# Patient Record
Sex: FEMALE | Race: WHITE | NOT HISPANIC OR LATINO | Employment: FULL TIME | URBAN - METROPOLITAN AREA
[De-identification: names, ages, dates, MRNs, and addresses within clinical notes are randomized per-mention and may not be internally consistent; named-entity substitution may affect disease eponyms.]

---

## 2017-08-28 ENCOUNTER — CONVERSION ENCOUNTER (OUTPATIENT)
Dept: OTHER | Facility: HOSPITAL | Age: 62
End: 2017-08-28

## 2018-08-21 ENCOUNTER — OFFICE VISIT CONVERTED (OUTPATIENT)
Dept: FAMILY MEDICINE CLINIC | Age: 63
End: 2018-08-21
Attending: FAMILY MEDICINE

## 2018-08-31 ENCOUNTER — CONVERSION ENCOUNTER (OUTPATIENT)
Dept: OTHER | Facility: HOSPITAL | Age: 63
End: 2018-08-31

## 2018-09-10 ENCOUNTER — CONVERSION ENCOUNTER (OUTPATIENT)
Dept: OTHER | Facility: HOSPITAL | Age: 63
End: 2018-09-10

## 2019-02-28 ENCOUNTER — OFFICE VISIT CONVERTED (OUTPATIENT)
Dept: FAMILY MEDICINE CLINIC | Age: 64
End: 2019-02-28
Attending: FAMILY MEDICINE

## 2019-06-07 ENCOUNTER — OFFICE VISIT CONVERTED (OUTPATIENT)
Dept: FAMILY MEDICINE CLINIC | Age: 64
End: 2019-06-07
Attending: FAMILY MEDICINE

## 2019-09-23 ENCOUNTER — HOSPITAL ENCOUNTER (OUTPATIENT)
Dept: OTHER | Facility: HOSPITAL | Age: 64
Discharge: HOME | End: 2019-09-23

## 2020-04-13 ENCOUNTER — OFFICE VISIT CONVERTED (OUTPATIENT)
Dept: FAMILY MEDICINE CLINIC | Age: 65
End: 2020-04-13
Attending: FAMILY MEDICINE

## 2020-10-12 ENCOUNTER — HOSPITAL ENCOUNTER (OUTPATIENT)
Dept: OTHER | Facility: HOSPITAL | Age: 65
Discharge: HOME | End: 2020-10-12

## 2021-01-22 ENCOUNTER — OFFICE VISIT CONVERTED (OUTPATIENT)
Dept: FAMILY MEDICINE CLINIC | Age: 66
End: 2021-01-22
Attending: FAMILY MEDICINE

## 2021-05-18 NOTE — PROGRESS NOTES
Paris Wright 1955     Office/Outpatient Visit    Visit Date: Tue, Aug 21, 2018 10:11 am    Provider: Saleem Jimenez MD (Assistant: Jovita Wright MA)    Location: [facility]        Electronically signed by Saleem Jimenez MD on  08/21/2018 06:05:33 PM                             SUBJECTIVE:        CC: blood pressure follow up         HPI:         Patient presents with hypertension.  Her current cardiac medication regimen includes a beta-blocker ( Toprol-XL ) and an ACE inhibitor ( Zestril ).  She did not bring her blood pressure diary, but says that pressures have been too high.  She is tolerating the medication well without side effects.  Compliance with treatment has been good; she takes her medication as directed and follows up as directed.      ROS:     CONSTITUTIONAL:  Negative for chills and fever.      CARDIOVASCULAR:  Negative for chest pain and palpitations.      RESPIRATORY:  Negative for recent cough and dyspnea.      GASTROINTESTINAL:  Negative for abdominal pain, nausea and vomiting.      INTEGUMENTARY/BREAST:  Negative for atypical mole(s) and rash.          [facility]/:     Last Reviewed on 8/21/2018 10:43 AM by Saleem Jimenez    Past Medical History:                 PAST MEDICAL HISTORY         Hypertension     Osteoarthritis: primarily affecting the R Knee;         PREVENTIVE HEALTH MAINTENANCE             COLORECTAL CANCER SCREENING: Up to date (colonoscopy q10y; sigmoidoscopy q5y; Cologuard q3y) was last done 09/2017, Results are in chart; colonoscopy with the following abnormalities noted-- tubular adenoma; The next colonoscopy is due  09/2020     MAMMOGRAM: Done within last 2 years and results in are chart was last done 08/2017 with normal results         Surgical History:         R Knee - arthroscopy;         Family History:         Positive for Coronary Artery Disease ( mother ) and Hypertension ( father; mother ).      Positive for Leukemia ( brother ) and Lung  "Cancer ( father ).      Positive for Hypothyroidism ( mother ).          Social History:     Occupation: [employer]     Marital Status:      Children: 2 children         Tobacco/Alcohol/Supplements:     Last Reviewed on 8/21/2018 10:43 AM by Saleem Jimenez    Tobacco: She has never smoked.          Alcohol: Frequency:    \"happy hours - once every 6 weeks maybe\";         Substance Abuse History:     Last Reviewed on 8/21/2018 10:43 AM by Saleem Jimenez    NEGATIVE         Mental Health History:     Last Reviewed on 8/21/2018 10:43 AM by Saleem Jimenez        Communicable Diseases (eg STDs):     Last Reviewed on 8/21/2018 10:43 AM by Saleem Jimenez            Current Problems:     Last Reviewed on 8/21/2018 10:43 AM by Saleem Jimenez    Screening for hypercholesterolemia     Hyperproteinemia     Screening for cancer of colon     Hypertension         Immunizations:     Flulaval 8/31/2010     Flulaval 9/21/2011         Allergies:     Last Reviewed on 8/21/2018 10:43 AM by Saleem Jimenez      No Known Drug Allergies.         Current Medications:     Last Reviewed on 8/21/2018 10:43 AM by Saleem Jimenez    Lisinopril 20mg Tablet Take 1 tablet(s) by mouth daily     Metoprolol Succinate 100mg Tablets, Extended Release Take 1 tablet(s) by mouth daily         OBJECTIVE:        Vitals:         Current: 8/21/2018 10:16:17 AM    Ht:  5 ft, 5.5 in;  Wt: 217.6 lbs;  BMI: 35.7    T: 98.5 F (oral);  BP: 148/69 mm Hg (left arm, sitting);  P: 72 bpm (right arm (BP Cuff), sitting);  sCr: 0.7 mg/dL;  GFR: 96.23        Exams:     PHYSICAL EXAM:     GENERAL: vital signs recorded - well developed,  moderately obese;  no apparent distress;     EYES: extraocular movements intact; conjunctiva and cornea are normal; PERRLA;     E/N/T:  normal EACs, TMs, nasal/oral mucosa, teeth, gingiva, and oropharynx;     NECK: range of motion is normal; thyroid is non-palpable;     " RESPIRATORY: normal respiratory rate and pattern with no distress; normal breath sounds with no rales, rhonchi, wheezes or rubs;     CARDIOVASCULAR: normal rate; rhythm is regular;  no systolic murmur; no edema;     GASTROINTESTINAL: nontender; normal bowel sounds; no organomegaly;     BREAST/INTEGUMENT: SKIN: no significant rashes or lesions; no suspicious moles;         ASSESSMENT           401.1   I10  Hypertension              DDx:         ORDERS:         Meds Prescribed:       Refill of: Lisinopril 20mg Tablet Take 1 tablet(s) by mouth daily  #90 (Ninety) tablet(s) Refills: 3       Refill of: Metoprolol Succinate 100mg Tablets, Extended Release Take 1 tablet(s) by mouth daily  #90 (Ninety) tablet(s) Refills: 3       Furosemide 20mg Tablets Take 1 tablet(s) by mouth daily  #30 (Thirty) tablet(s) Refills: 0         Radiology/Test Orders:       3014F  Screening mammography results documented and reviewed (PV)1  (In-House)         3017F  Colorectal CA screen results documented and reviewed (PV)  (In-House)           Other Orders:         Calculated BMI above the upper parameter and a follow-up plan was documented in the medical record  (In-House)                   PLAN:          Hypertension         RECOMMENDATIONS given include: Today, we have reviewed Paris's care.  I'm going to add a low dose of furosemide to what she is already taking.  We will go ahead and check labs as noted.  No other changes are anticipated.  We will schedule screening mammogram.  We will follow closely moving forward..  MIPS     MAMMOGRAM: Done within last 2 years and results in are chart     COLORECTAL CANCER SCREENING: Results are in chart     BMI Elevated - Follow-Up Plan: She was provided education on weight loss strategies           Prescriptions:       Refill of: Lisinopril 20mg Tablet Take 1 tablet(s) by mouth daily  #90 (Ninety) tablet(s) Refills: 3       Refill of: Metoprolol Succinate 100mg Tablets, Extended Release Take 1  tablet(s) by mouth daily  #90 (Ninety) tablet(s) Refills: 3       Furosemide 20mg Tablets Take 1 tablet(s) by mouth daily  #30 (Thirty) tablet(s) Refills: 0           Orders:       3014F  Screening mammography results documented and reviewed (PV)1  (In-House)         3017F  Colorectal CA screen results documented and reviewed (PV)  (In-House)           Calculated BMI above the upper parameter and a follow-up plan was documented in the medical record  (In-House)             Patient Education Handouts:       High Blood Pressure (HTN)              CHARGE CAPTURE           **Please note: ICD descriptions below are intended for billing purposes only and may not represent clinical diagnoses**        Primary Diagnosis:         401.1 Hypertension            I10    Essential (primary) hypertension              Orders:          28181   Office/outpatient visit; established patient, level 3  (In-House)             3014F   Screening mammography results documented and reviewed (PV)1  (In-House)             3017F   Colorectal CA screen results documented and reviewed (PV)  (In-House)                Calculated BMI above the upper parameter and a follow-up plan was documented in the medical record  (In-House)               ADDENDUMS:      ____________________________________    Addendum: 09/28/2018 03:54 PM - Crystal Enrique         Visit Note Faxed to:        Ramos Jimenez  (General Surgery); Number [phone]            Addendum: 10/01/2018 09:52 AM - Andreina Shipman         Visit Note Faxed to:        Ramos Jimenez  (General Surgery); Number [phone]            Addendum: 10/01/2018 11:10 AM - Crystal Enrique         Visit Note Faxed to:        Matthew Rothman (Oncology); Number [phone]            Addendum: 10/01/2018 02:23 PM - Four, Team         Visit Note Faxed to:        User Entered Recipient; Number [phone]

## 2021-05-18 NOTE — PROGRESS NOTES
Paris Wright  1955     Office/Outpatient Visit    Visit Date: Fri, Jan 22, 2021 10:45 am    Provider: Saleem Jimenez MD (Assistant: Gin Gar RN)    Location: [facility]        Electronically signed by Saleem Jimenez MD on  01/25/2021 06:32:34 AM                             Subjective:        CC: blood pressure, elevated glucose    HPI:           Patient presents with essential (primary) hypertension.  Her current cardiac medication regimen includes a beta-blocker ( Toprol-XL ) and an ACE inhibitor ( Zestril ).  She did not bring her blood pressure diary, but says that typical readings show systolics in the 130s and diastolics in the 80s.  She is tolerating the medication well without side effects.  Compliance with treatment has been good; she takes her medication as directed and follows up as directed.      ROS:     CONSTITUTIONAL:  Negative for chills and fever.      CARDIOVASCULAR:  Negative for chest pain and palpitations.      RESPIRATORY:  Negative for recent cough and dyspnea.      GASTROINTESTINAL:  Negative for abdominal pain, nausea and vomiting.      INTEGUMENTARY/BREAST:  Negative for atypical mole(s) and rash.          Past Medical History / Family History / Social History:         Last Reviewed on 1/22/2021 11:14 AM by Saleem Jimenez    Past Medical History:                 PAST MEDICAL HISTORY         Hypertension     Osteoarthritis: primarily affecting the R Knee;     Breast cancer: ductal carcinoma in situ;         CURRENT MEDICAL PROVIDERS:    Oncologist: Dr. Rothman             ADVANCE DIRECTIVES: Living will - She says that her son or daughter would make decisions if needed.          PREVENTIVE HEALTH MAINTENANCE             COLORECTAL CANCER SCREENING: Up to date (colonoscopy q10y; sigmoidoscopy q5y; Cologuard q3y) was last done 09/2017, Results are in chart; colonoscopy with the following abnormalities noted-- tubular adenoma; The next  "colonoscopy is due  09/2020     MAMMOGRAM: Done within last 2 years and results in are chart was last done 9/5/18 which was abnormal         Surgical History:         Lumpectomy R Breast - 11/2018;     R Knee - arthroscopy;         Family History:     Father: Hypertension;  Lung Cancer     Mother: Coronary Artery Disease; Hypertension; Hypothyroidism     Brother(s): Myelodysplastic Syndrome         Social History:     Occupation: [employer]     Marital Status:      Children: 2 children         Tobacco/Alcohol/Supplements:     Last Reviewed on 1/22/2021 11:14 AM by Saleem Jimenez    Tobacco: She has never smoked.          Alcohol: Frequency:    \"happy hours - once every 6 weeks maybe\";         Substance Abuse History:     Last Reviewed on 1/22/2021 11:14 AM by Saleem Jimenez    NEGATIVE         Mental Health History:     Last Reviewed on 1/22/2021 11:14 AM by Saleem Jimenez        Communicable Diseases (eg STDs):     Last Reviewed on 1/22/2021 11:14 AM by Saleem Jimenez        Current Problems:     Last Reviewed on 1/22/2021 11:14 AM by Saleem Jimenez    Essential (primary) hypertension    Encounter for screening for malignant neoplasm of colon    Other specified abnormalities of plasma proteins    Encounter for screening for lipoid disorders    Acute upper respiratory infection, unspecified    Impaired fasting glucose    Mixed hyperlipidemia        Immunizations:     Flulaval 8/31/2010    Flulaval 9/21/2011        Allergies:     Last Reviewed on 1/22/2021 11:14 AM by Saleem Jimenez    No Known Allergies.        Current Medications:     Last Reviewed on 1/22/2021 11:14 AM by Saleem Jimenez    lisinopriL 20 mg oral tablet [TAKE 1 TABLET DAILY NEEDS APPOINTMENT]    Vitamin D3 50 mcg (2,000 unit) oral capsule [1 capsule daily]    Oxybutynin Chloride 5 mg oral tablet [1 tab bid]    tamoxifen 20 mg oral tablet [1 tab daily]    metoprolol succinate 100 mg " oral Tablet, Extended Release 24 hr [TAKE 1 TABLET DAILY NEEDS APPOINTMENT]    Calcium+D  [OTC once daily]        Objective:        Vitals:         Current: 1/22/2021 10:52:00 AM    Ht:  5 ft, 5.5 in;  Wt: 220 lbs;  BMI: 36.1T: 97.8 F (temporal);  BP: 152/82 mm Hg (right arm, sitting);  P: 90 bpm (right arm (BP Cuff), sitting);  sCr: 0.7 mg/dL;  GFR: 94.21        Repeat:     11:23:30 AM  BP:   165/72mm Hg (right arm, sitting, pulse-85)     Exams:     PHYSICAL EXAM:     GENERAL: vital signs recorded - well developed,  moderately obese;  no apparent distress;     EYES: extraocular movements intact; conjunctiva and cornea are normal; PERRL;     NECK: range of motion is normal; thyroid is non-palpable;     RESPIRATORY: normal respiratory rate and pattern with no distress; normal breath sounds with no rales, rhonchi, wheezes or rubs;     CARDIOVASCULAR: normal rate; rhythm is regular;  no systolic murmur; no edema;     GASTROINTESTINAL: nontender; normal bowel sounds; no organomegaly;     LYMPHATIC: no enlargement of cervical or facial nodes; no supraclavicular nodes;     BREAST/INTEGUMENT: SKIN: no significant rashes or lesions; no suspicious moles;     NEUROLOGIC: mental status: alert and oriented x 3; cranial nerves II-XII grossly intact;     PSYCHIATRIC: appropriate affect and demeanor; normal psychomotor function;         Lab/Test Results:         Hemoglobin A1c: 6.7 (01/22/2021),     Performed by:: williams (01/22/2021),             Assessment:         I10   Essential (primary) hypertension       R73.01   Impaired fasting glucose       E11.9   Type 2 diabetes mellitus without complications           ORDERS:         Meds Prescribed:       [Refilled] lisinopriL 20 mg oral tablet [take 1 tablet (20 mg) by oral route once daily], #90 (ninety) tablets, Refills: 3 (three)       [Refilled] metoprolol succinate 100 mg oral Tablet, Extended Release 24 hr [take 1 tablet (100 mg) by oral route once daily], #90 (ninety) tablets,  Refills: 3 (three)         Radiology/Test Orders:       3017F  Colorectal CA screen results documented and reviewed (PV)  (In-House)              Lab Orders:       [id number]*  Hgb A1c fast lab  (In-House)              Procedures Ordered:       21693  Collection of capillary blood specimen (eg, finger, heel, ear stick)  (In-House)    Left middle finger/tls          Other Orders:       1101F  Pt screen for fall risk; document no falls in past year or only 1 fall w/o injury in past year (FENG)  (In-House)              Screening mammogram results documented  (Send-Out)            1123F  Advance Care Planning discussed and doc; advance care plan or surrogate decision maker doc. in MR  (Send-Out)                      Plan:         Essential (primary) hypertension        RECOMMENDATIONS given include: Paris seems well today.  Her pressure is running in a good range at home.  We will recheck her pressure here and refill needed medications.  A1C to be checked as noted.  We will move forward from there..  SARA Has had no falls or only one fall without injury in the past year Screening mammomgram done within last 2 years and results in are chart Colorectal Cancer Screening is up to date and the results are in the chart ACP discussion: Advance Directive/Surrogate Decision Maker discussed and scanned into chart           Prescriptions:       [Refilled] lisinopriL 20 mg oral tablet [take 1 tablet (20 mg) by oral route once daily], #90 (ninety) tablets, Refills: 3 (three)       [Refilled] metoprolol succinate 100 mg oral Tablet, Extended Release 24 hr [take 1 tablet (100 mg) by oral route once daily], #90 (ninety) tablets, Refills: 3 (three)           Orders:       1101F  Pt screen for fall risk; document no falls in past year or only 1 fall w/o injury in past year (FENG)  (In-House)              Screening mammogram results documented  (Send-Out)            3017F  Colorectal CA screen results documented and reviewed (PV)   (In-House)            1123F  Advance Care Planning discussed and doc; advance care plan or surrogate decision maker doc. in MR  (Send-Out)              Impaired fasting glucose    LABORATORY:  Labs ordered to be performed today include Hgb A1c inhouse fast lab.            Orders:       63410*  Hgb A1c fast lab  (In-House)            99435  Collection of capillary blood specimen (eg, finger, heel, ear stick)  (In-House)    Left middle finger/tls          Type 2 diabetes mellitus without complications    ADDENDUM - Please let Paris know that her A1C came back elevated at 6.7%.  This means that her average blood sugar the last 90 days has been around 140.  This is technically in the range of early diabetes.  I am not recommending medication for this yet, but I would encourage her to work on diet and some gradual weight loss.  Specifically, I would advise she avoid concentrated sugars such as regular soda, sweet tea, and desserts.  TICKLE for follow up visit with me to reassess her care.  Also, if she would like to see a dietician to review diet, make it so.  Let me know if she has concerns and forward her copy of this testing if she would like.  Thanks. - Saleem Jimenez MD - 1/22/21 - 12:331/22/21 2:42pm  tr/            Charge Capture:         Primary Diagnosis:     I10  Essential (primary) hypertension           Orders:      57331  Office/outpatient visit; established patient, level 4  (In-House)            1101F  Pt screen for fall risk; document no falls in past year or only 1 fall w/o injury in past year (FENG)  (In-House)            3017F  Colorectal CA screen results documented and reviewed (PV)  (In-House)              R73.01  Impaired fasting glucose           Orders:      93444*  Hgb A1c fast lab  (In-House)            31292  Collection of capillary blood specimen (eg, finger, heel, ear stick)  (In-House)    Left middle finger/tls          E11.9  Type 2 diabetes mellitus without complications          ADDENDUMS:      ____________________________________    Addendum: 01/26/2021 01:58 PM - Four, Team        lmtrc/th        Addendum: 01/27/2021 09:13 AM - Four, Team        pt inf re: addendum, tickled/th        Addendum: 01/27/2021 09:13 AM - Four, Team        pt declined referral to dietician at this time/th        Addendum: 04/21/2021 03:07 PM - Daniela Rosa        appointment reminder mailed to pt./atc

## 2021-05-18 NOTE — PROGRESS NOTES
Paris Wright  1955     Office/Outpatient Visit    Visit Date: Mon, Apr 13, 2020 11:22 am    Provider: Saleem Jimenez MD (Assistant: Jovita Wright MA)    Location: [facility]        Electronically signed by Saleem Jimenez MD on  04/15/2020 11:06:22 AM                             Subjective:        CC: blood pressure        TELEMEDICINE VISIT:    - Paris consented to this telemedicine visit.    - Persons present during the telemedicine consultation include:  Paris - patient, Dr. Jimenez    - This visit is being conducted over Big[employer] with audio and video.    HPI:           Patient to be evaluated for essential (primary) hypertension.  Her current cardiac medication regimen includes a beta-blocker ( Toprol-XL ) and an ACE inhibitor ( Zestril ).  She did not bring her blood pressure diary, but says that typical readings show systolics in the 130s and diastolics in the 80s.  She is tolerating the medication well without side effects.  Compliance with treatment has been good; she takes her medication as directed and follows up as directed.      ROS:     CONSTITUTIONAL:  Negative for chills and fever.      CARDIOVASCULAR:  Negative for chest pain and palpitations.      RESPIRATORY:  Negative for recent cough and dyspnea.      GASTROINTESTINAL:  Negative for abdominal pain, nausea and vomiting.      INTEGUMENTARY/BREAST:  Negative for atypical mole(s) and rash.          Past Medical History / Family History / Social History:         Last Reviewed on 4/13/2020 12:31 PM by Saleem Jimenez    Past Medical History:                 PAST MEDICAL HISTORY         Hypertension     Osteoarthritis: primarily affecting the R Knee;     Breast cancer: ductal carcinoma in situ;         CURRENT MEDICAL PROVIDERS:    Oncologist: Dr. Rothman             ADVANCE DIRECTIVES: Living will - She says that her son or daughter would make decisions if needed.          PREVENTIVE HEALTH  "MAINTENANCE             COLORECTAL CANCER SCREENING: Up to date (colonoscopy q10y; sigmoidoscopy q5y; Cologuard q3y) was last done 09/2017, Results are in chart; colonoscopy with the following abnormalities noted-- tubular adenoma; The next colonoscopy is due  09/2020     MAMMOGRAM: Done within last 2 years and results in are chart was last done 9/5/18 which was abnormal         Surgical History:         Lumpectomy R Breast - 11/2018;     R Knee - arthroscopy;         Family History:     Father: Hypertension;  Lung Cancer     Mother: Coronary Artery Disease; Hypertension; Hypothyroidism     Brother(s): Myelodysplastic Syndrome         Social History:     Occupation: [employer]     Marital Status:      Children: 2 children         Tobacco/Alcohol/Supplements:     Last Reviewed on 4/13/2020 12:31 PM by Saleem Jimenez    Tobacco: She has never smoked.          Alcohol: Frequency:    \"happy hours - once every 6 weeks maybe\";         Substance Abuse History:     Last Reviewed on 4/13/2020 12:31 PM by Saleem Jimenez    NEGATIVE         Mental Health History:     Last Reviewed on 4/13/2020 12:31 PM by Saleem Jimenez        Communicable Diseases (eg STDs):     Last Reviewed on 4/13/2020 12:31 PM by Saleem Jimenez        Current Problems:     Last Reviewed on 4/13/2020 12:31 PM by Saleem Jimenez    Encounter for screening for malignant neoplasm of colon    Other specified abnormalities of plasma proteins    Encounter for screening for lipoid disorders    Acute upper respiratory infection, unspecified    Impaired fasting glucose    Mixed hyperlipidemia    Essential (primary) hypertension        Immunizations:     Flulaval 8/31/2010    Flulaval 9/21/2011        Allergies:     Last Reviewed on 4/13/2020 12:31 PM by Saleem Jimenez    No Known Allergies.        Current Medications:     Last Reviewed on 4/13/2020 12:31 PM by Saleem Jimenez    lisinopriL 20 mg oral " tablet [Take 1 tablet(s) by mouth daily]    Vitamin D3 50 mcg (2,000 unit) oral capsule [1 capsule daily]    Oxybutynin Chloride 5 mg oral tablet [1 tab bid]    tamoxifen 20 mg oral tablet [1 tab daily]    metoprolol succinate 100 mg oral Tablet, Extended Release 24 hr [Take 1 tablet(s) by mouth daily]        Objective:        Exams:     PHYSICAL EXAM:     GENERAL: well developed, well nourished;  no apparent distress;     EYES: extraocular movements intact; conjunctiva and cornea are normal;     RESPIRATORY: normal respiratory rate and pattern with no distress;     NEUROLOGIC: mental status: alert and oriented x 3;     PSYCHIATRIC: appropriate affect and demeanor; normal psychomotor function;         Assessment:         I10   Essential (primary) hypertension           ORDERS:         Meds Prescribed:       [Refilled] lisinopriL 20 mg oral tablet [Take 1 tablet(s) by mouth daily], #90 (ninety) tablets, Refills: 3 (three)       [Refilled] metoprolol succinate 100 mg oral Tablet, Extended Release 24 hr [Take 1 tablet(s) by mouth daily], #90 (ninety) tablets, Refills: 3 (three)         Radiology/Test Orders:       3017F  Colorectal CA screen results documented and reviewed (PV)  (In-House)              Other Orders:         Screening mammogram results documented  (Send-Out)            1123F  Advance Care Planning discussed and doc; advance care plan or surrogate decision maker doc. in MR  (Send-Out)                      Plan:         Essential (primary) hypertension        RECOMMENDATIONS given include: Today, we have reviewed her care.  She seems well.  Her labs from the fall were normal.  We will refill her medications and follow from there..  MIPS Screening mammomgram done within last 2 years and results in are chart Colorectal Cancer Screening is up to date and the results are in the chart ACP discussion: Advance Directive/Surrogate Decision Maker discussed and scanned into chart           Prescriptions:        [Refilled] lisinopriL 20 mg oral tablet [Take 1 tablet(s) by mouth daily], #90 (ninety) tablets, Refills: 3 (three)       [Refilled] metoprolol succinate 100 mg oral Tablet, Extended Release 24 hr [Take 1 tablet(s) by mouth daily], #90 (ninety) tablets, Refills: 3 (three)           Orders:         Screening mammogram results documented  (Send-Out)            3017F  Colorectal CA screen results documented and reviewed (PV)  (In-House)            1123F  Advance Care Planning discussed and doc; advance care plan or surrogate decision maker doc. in MR  (Send-Out)                  Charge Capture:         Primary Diagnosis:     I10  Essential (primary) hypertension           Orders:      70864  Office/outpatient visit; established patient, level 3  (In-House)            3017F  Colorectal CA screen results documented and reviewed (PV)  (In-House)

## 2021-05-18 NOTE — PROGRESS NOTES
Paris Wright 1955     Office/Outpatient Visit    Visit Date: Thu, Feb 28, 2019 01:18 pm    Provider: Saleem Jimenez MD (Assistant: Gin Gar RN)    Location: [facility]        Electronically signed by Saleem Jimenez MD on  03/01/2019 06:08:38 AM                             SUBJECTIVE:        CC: cold symptoms         HPI:     Paris is in today for follow up on chills.  She says that she developed cough two days ago.  She coughed most of the night and had chills.  She has not checked her temperature.  She says that the cough is dry.  She says that she feels okay during the day.  She has more of an issue at night.  She is to start radiating in the next couple of weeks.  She is anticipating 15 treatments         With regard to the hypertension, her current cardiac medication regimen includes a beta-blocker ( Toprol-XL ) and an ACE inhibitor ( Zestril ).  She did not bring her blood pressure diary, but says that pressures have been okay.  She is tolerating the medication well without side effects.  Compliance with treatment has been good; she takes her medication as directed and follows up as directed.      ROS:         E/N/T:  Negative for diminished hearing and nasal congestion.      CARDIOVASCULAR:  Negative for chest pain and palpitations.      GASTROINTESTINAL:  Negative for abdominal pain, nausea and vomiting.      INTEGUMENTARY/BREAST:  Negative for atypical mole(s) and rash.          PMH/FMH/SH:     Last Reviewed on 2/28/2019 02:02 PM by Saleem Jimenez    Past Medical History:                 PAST MEDICAL HISTORY         Hypertension     Osteoarthritis: primarily affecting the R Knee;     Breast cancer: ductal carcinoma in situ;         PREVENTIVE HEALTH MAINTENANCE             COLORECTAL CANCER SCREENING: Up to date (colonoscopy q10y; sigmoidoscopy q5y; Cologuard q3y) was last done 09/2017, Results are in chart; colonoscopy with the following abnormalities noted-- tubular  "adenoma; The next colonoscopy is due  09/2020     MAMMOGRAM: Done within last 2 years and results in are chart was last done 9/5/18 which was abnormal         Surgical History:         Lumpectomy R Breast - 11/2018;      R Knee - arthroscopy;         Family History:         Positive for Coronary Artery Disease ( mother ) and Hypertension ( father; mother ).      Positive for Leukemia ( brother ) and Lung Cancer ( father ).      Positive for Hypothyroidism ( mother ).          Social History:     Occupation: [employer]     Marital Status:      Children: 2 children         Tobacco/Alcohol/Supplements:     Last Reviewed on 2/28/2019 02:02 PM by Saleem Jimenez    Tobacco: She has never smoked.          Alcohol: Frequency:    \"happy hours - once every 6 weeks maybe\";         Substance Abuse History:     Last Reviewed on 2/28/2019 02:02 PM by Saleem Jimenez    NEGATIVE         Mental Health History:     Last Reviewed on 2/28/2019 02:02 PM by Saleem Jimenez        Communicable Diseases (eg STDs):     Last Reviewed on 2/28/2019 02:02 PM by Saleem Jimenez            Current Problems:     Last Reviewed on 2/28/2019 02:02 PM by Saleem Jimenez    Chills     Screening for hypercholesterolemia     Hyperproteinemia     Screening for cancer of colon     Hypertension     Hyperglycemia         Immunizations:     Flulaval 8/31/2010     Flulaval 9/21/2011         Allergies:     Last Reviewed on 2/28/2019 02:02 PM by Saleem Jimenez      No Known Drug Allergies.         Current Medications:     Last Reviewed on 2/28/2019 02:02 PM by Saleem Jimenez    Lisinopril 20mg Tablet Take 1 tablet(s) by mouth daily     Metoprolol Succinate 100mg Tablets, Extended Release Take 1 tablet(s) by mouth daily     Oxybutynin Chloride 5mg Tablet 1 tab bid     Tamoxifen Citrate 20mg Tablet 1 tab daily     Vitamin D3 2,000IU Capsules 1 capsule daily         OBJECTIVE:        Vitals:         " Current: 2/28/2019 1:21:05 PM    Ht:  5 ft, 5.5 in;  Wt: 218.4 lbs;  BMI: 35.8    T: 99.3 F (oral);  BP: 134/64 mm Hg (left arm, sitting);  P: 84 bpm (left arm (BP Cuff), sitting);  sCr: 0.7 mg/dL;  GFR: 95.15        Exams:     PHYSICAL EXAM:     GENERAL: vital signs recorded - well developed,  moderately obese;  no apparent distress;     EYES: extraocular movements intact; conjunctiva and cornea are normal; PERRLA;     E/N/T:  normal EACs, TMs, nasal/oral mucosa, teeth, gingiva, and oropharynx;     NECK: range of motion is normal; thyroid is non-palpable;     RESPIRATORY: normal respiratory rate and pattern with no distress; normal breath sounds with no rales, rhonchi, wheezes or rubs;     CARDIOVASCULAR: normal rate; rhythm is regular;  no systolic murmur; no edema;     GASTROINTESTINAL: nontender; normal bowel sounds; no organomegaly;     LYMPHATIC: no enlargement of cervical or facial nodes; no supraclavicular nodes;     BREAST/INTEGUMENT: SKIN: no significant rashes or lesions; no suspicious moles;     NEUROLOGICAL:  cranial nerves, motor and sensory function, reflexes, gait and coordination are all intact;     PSYCHIATRIC:  appropriate affect and demeanor; normal speech pattern; grossly normal memory;         Lab/Test Results:             Influenza A and B:  Negative (02/28/2019),     Performed by::  CR (02/28/2019),     Hemoglobin A1c:  6.9 (02/28/2019),             ASSESSMENT           780.99   J06.9  Chills              DDx:     401.1   I10  Hypertension              DDx:     790.6   R73.01  Hyperglycemia              DDx:         ORDERS:         Meds Prescribed:       Promethazine with Dextromethrophan 6.25mg/15mg per 5ml Syrup Take 1 to 2 teaspoon by mouth q 4 to 6 hr as needed for cough  #4 (Four) oz Refills: 1       Benzonatate 200mg Capsules Take 1 capsule(s) by mouth tid prn  #24 (Twenty Four) capsule(s) Refills: 0         Radiology/Test Orders:       3014F  Screening mammography results documented  and reviewed (PV)1  (In-House)         3017F  Colorectal CA screen results documented and reviewed (PV)  (In-House)           Lab Orders:       00223  Infectious agent antigen detection by immunoassay; Influenza  (In-House)         92652-31  Infectious agent antigen detection by immunoassay; Influenza  (In-House)         70139*  Hgb A1c fast lab  (In-House)           Other Orders:         Calculated BMI above the upper parameter and a follow-up plan was documented in the medical record  (In-House)                   PLAN:          Chills         RECOMMENDATIONS given include: Today, we have reviewed Paris's care.  I'm going to cover her for viral syndrome as noted.  If she does not improve, she will return.  I don't see an indication for antibiotic at this time..  MIPS     MAMMOGRAM: Done within last 2 years and results in are chart     COLORECTAL CANCER SCREENING: Results are in chart     BMI Elevated - Follow-Up Plan: She was provided education on weight loss strategies         ADDENDUM - Please let Paris know that her sugar is higher than we have seen previously.  The A1C came back at 6.9% which means that her average sugar the last 90 days has been just below 150.  This is technically in a range for diabetes.  I am not recommending medication at this time, but I would recommend she try to limit her intake of sugars (regular soda, sweet tea, desserts) and carbs (bread, potatoes, pasta, rice).  Additionally, if she would start to walk regularly and work toward some gradual weight loss it would be helpful.  TICKLE for follow up office visit in 90 days to reassess and discuss further.  We will want to keep a close eye on this with her moving forward.  Let me know if she has concerns. - Saleem Jimenez MD - 3/1/19 - 05:53           Prescriptions:       Promethazine with Dextromethrophan 6.25mg/15mg per 5ml Syrup Take 1 to 2 teaspoon by mouth q 4 to 6 hr as needed for cough  #4 (Four) oz Refills: 1        Benzonatate 200mg Capsules Take 1 capsule(s) by mouth tid prn  #24 (Twenty Four) capsule(s) Refills: 0           Orders:       13599  Infectious agent antigen detection by immunoassay; Influenza  (In-House)         10761-40  Infectious agent antigen detection by immunoassay; Influenza  (In-House)         3014F  Screening mammography results documented and reviewed (PV)1  (In-House)         3017F  Colorectal CA screen results documented and reviewed (PV)  (In-House)           Calculated BMI above the upper parameter and a follow-up plan was documented in the medical record  (In-House)             Patient Education Handouts:       Antibiotics           Hypertension As above.          Hyperglycemia     LABORATORY:  Labs ordered to be performed today include Hgb A1c inhouse fast lab.            Orders:       52319*  Hgb A1c fast lab  (In-House)               CHARGE CAPTURE           **Please note: ICD descriptions below are intended for billing purposes only and may not represent clinical diagnoses**        Primary Diagnosis:         780.99 Chills            J06.9    Acute upper respiratory infection, unspecified              Orders:          86279   Office/outpatient visit; established patient, level 4  (In-House)             55107   Infectious agent antigen detection by immunoassay; Influenza  (In-House)             45088 -59  Infectious agent antigen detection by immunoassay; Influenza  (In-House)             3014F   Screening mammography results documented and reviewed (PV)1  (In-House)             3017F   Colorectal CA screen results documented and reviewed (PV)  (In-House)                Calculated BMI above the upper parameter and a follow-up plan was documented in the medical record  (In-House)           401.1 Hypertension            I10    Essential (primary) hypertension    790.6 Hyperglycemia            R73.01    Impaired fasting glucose              Orders:          39481*   Hgb A1c fast lab   (In-House)               ADDENDUMS:      ____________________________________    Addendum: 03/01/2019 09:01 AM - Four, Team        pt inf, tickled/th        Addendum: 05/08/2019 11:24 AM - Daniela Rosa        attempted to contact pt, no answer, no voicemail./atc        Addendum: 05/28/2019 02:29 PM - Daniela Rosa        pt inf re tickle, appt scheduled./atc

## 2021-05-18 NOTE — PROGRESS NOTES
Paris Wright 1955     Office/Outpatient Visit    Visit Date: Fri, Jun 7, 2019 10:16 am    Provider: Saleem Jimenez MD (Assistant: Alee Wetzel MA)    Location: [facility]        Electronically signed by Saleem Jimenez MD on  06/09/2019 08:52:48 PM                             SUBJECTIVE:        CC: physical exam         HPI:         Mrs. Wright is here for a Medicare wellness visit.  The required HRA questions are integrated within this visit note. Family medical history and individual medical/surgical history were reviewed and updated.  A current height, weight, BMI, blood pressure, and pulse were recorded in the vitals section of the note and have been reviewed. Patient's medications, including supplements, were recorded in the chart and reviewed.  Current providers and suppliers were reviewed and updated.          Self-Assessment of Health: She rates her health as good. She rates her confidence of being able to control/manage most of her health problems as very confident. Her physical/emotional health has limited her social activites slightly.  A review of cognitive impairment was performed, including ability to drive a car, manage finances, and any memory changes, and was found to be negative.  A review of functional ability, including bathing, dressing, walking, and urine/bowel continence as well as level of safety was performed and was found to be negative.  Falls Risk: Has not had any falls or only one fall without injury in the past year.  She denies having trouble hearing the TV/radio when others do not, having to strain to hear or understand conversations and wearing hearing aid(s).  Concerning home safety, she reports that at home she DOES have adequate lighting, a skid resistant shower/tub, handrails on stairs and functioning smoke alarms, but not grab bars in the bath or absence of throw rugs.          Immunization Status: ** >10 years since last Td booster; ** Has not  received pneumococcal vaccination; ** Has not received influenza vaccine for this season; ** Has not received Prevnar 13 vaccination; Age>60, no shingles vaccination; Physical Activity: She never excercises.; Type of diet patient normally eats is described as poor--needs improvement.  Tobacco: She has never smoked.          With regard to the hypertension, her current cardiac medication regimen includes a beta-blocker ( Toprol-XL ) and an ACE inhibitor ( Zestril ).  She did not bring her blood pressure diary, but says that pressures have been okay.  She is tolerating the medication well without side effects.  Compliance with treatment has been good; she takes her medication as directed and follows up as directed.          With regard to the hypercholesterolemia, current treatment includes diet.  Compliance with treatment has been good; she maintains her low cholesterol diet and follows up as directed.      ROS:     CONSTITUTIONAL:  Negative for chills and fever.      CARDIOVASCULAR:  Negative for chest pain and palpitations.      RESPIRATORY:  Negative for recent cough and dyspnea.      GASTROINTESTINAL:  Negative for abdominal pain, nausea and vomiting.      INTEGUMENTARY/BREAST:  Negative for atypical mole(s) and rash.          PMH/FMH/SH:     Last Reviewed on 6/07/2019 10:43 AM by Saleem Jimenez    Past Medical History:                 PAST MEDICAL HISTORY         Hypertension     Osteoarthritis: primarily affecting the R Knee;     Breast cancer: ductal carcinoma in situ;         CURRENT MEDICAL PROVIDERS:    Oncologist: Dr. Rothman             ADVANCE DIRECTIVES: Living will - She says that her son or daughter would make decisions if needed.          PREVENTIVE HEALTH MAINTENANCE             COLORECTAL CANCER SCREENING: Up to date (colonoscopy q10y; sigmoidoscopy q5y; Cologuard q3y) was last done 09/2017, Results are in chart; colonoscopy with the following abnormalities noted-- tubular adenoma; The next  "colonoscopy is due  09/2020     MAMMOGRAM: Done within last 2 years and results in are chart was last done 9/5/18 which was abnormal         Surgical History:         Lumpectomy R Breast - 11/2018;      R Knee - arthroscopy;         Family History:     Father: Hypertension;  Lung Cancer     Mother: Coronary Artery Disease; Hypertension; Hypothyroidism     Brother(s): Myelodysplastic Syndrome         Social History:     Occupation: [employer]     Marital Status:      Children: 2 children         Tobacco/Alcohol/Supplements:     Last Reviewed on 6/07/2019 10:43 AM by Saleem Jimenez    Tobacco: She has never smoked.          Alcohol: Frequency:    \"happy hours - once every 6 weeks maybe\";         Substance Abuse History:     Last Reviewed on 6/07/2019 10:43 AM by Saleem Jimenez    NEGATIVE         Mental Health History:     Last Reviewed on 6/07/2019 10:43 AM by Saleem Jimenez        Communicable Diseases (eg STDs):     Last Reviewed on 6/07/2019 10:43 AM by Saleem Jimenez            Current Problems:     Last Reviewed on 6/07/2019 10:43 AM by Saleem Jimenez    Chills     Screening for hypercholesterolemia     Hyperproteinemia     Screening for cancer of colon     Hypertension         Immunizations:     Flulaval 8/31/2010     Flulaval 9/21/2011         Allergies:     Last Reviewed on 6/07/2019 10:43 AM by Saleem Jimenez      No Known Drug Allergies.         Current Medications:     Last Reviewed on 6/07/2019 10:43 AM by Saleem Jimenez    Lisinopril 20mg Tablet Take 1 tablet(s) by mouth daily     Metoprolol Succinate 100mg Tablets, Extended Release Take 1 tablet(s) by mouth daily     Oxybutynin Chloride 5mg Tablet 1 tab bid     Tamoxifen Citrate 20mg Tablet 1 tab daily     Vitamin D3 2,000IU Capsules 1 capsule daily         OBJECTIVE:        Vitals:         Current: 6/7/2019 10:24:30 AM    Ht:  5 ft, 5.5 in;  Wt: 209.4 lbs;  BMI: 34.3    T: 98.4 F (oral);  " BP: 140/77 mm Hg (left arm, sitting);  P: 72 bpm (left arm (BP Cuff), sitting);  sCr: 0.7 mg/dL;  GFR: 93.46        Repeat:     11:03:14 AM     BP:   137/64mm Hg (left arm, sitting)         Exams:     PHYSICAL EXAM:     GENERAL: vital signs recorded - well developed, well nourished;  no apparent distress;     EYES: extraocular movements intact; conjunctiva and cornea are normal; PERRLA; binocular vision is 20/30 - hearing is normal     E/N/T:  normal EACs, TMs, nasal/oral mucosa, teeth, gingiva, and oropharynx;     NECK: range of motion is normal; thyroid is non-palpable;     RESPIRATORY: normal respiratory rate and pattern with no distress; normal breath sounds with no rales, rhonchi, wheezes or rubs;     CARDIOVASCULAR: normal rate; rhythm is regular;  no systolic murmur; no edema;     GASTROINTESTINAL: nontender; normal bowel sounds; no organomegaly;     LYMPHATIC: no enlargement of cervical or facial nodes; no supraclavicular nodes;     BREAST/INTEGUMENT: SKIN: no significant rashes or lesions; no suspicious moles;     NEUROLOGIC: mental status: alert and oriented x 3; cranial nerves II-XII grossly intact;     PSYCHIATRIC: appropriate affect and demeanor; normal psychomotor function;         Lab/Test Results:         LABORATORY RESULTS: EKG performed by tls         Performed by::  tls (06/07/2019),     HDL:  45 (06/07/2019),     LDL:  91 (06/07/2019),     Total Cholesterol:  192 (06/07/2019),     Triglycerides:  282 (06/07/2019),     non-HDL:  147 (06/07/2019),     LDL/HDL Ratio:  2.0 (06/07/2019),     Hemoglobin A1c:  5.9 (06/07/2019),             ASSESSMENT           V70.0   Z00.01  Yearly physical exam              DDx:     401.1   I10  Hypertension              DDx:     272.0   E78.2  Hypercholesterolemia              DDx:     790.6   R73.01  Hyperglycemia              DDx:         ORDERS:         Meds Prescribed:       Refill of: Lisinopril 20mg Tablet Take 1 tablet(s) by mouth daily  #90 (Ninety) tablet(s)  Refills: 1       Refill of: Metoprolol Succinate 100mg Tablets, Extended Release Take 1 tablet(s) by mouth daily  #90 (Ninety) tablet(s) Refills: 1         Radiology/Test Orders:       3014F  Screening mammography results documented and reviewed (PV)1  (In-House)         3017F  Colorectal CA screen results documented and reviewed (PV)  (In-House)         47790  Electrocardiogram, routine with at least 12 leads; with interpretation and report  (In-House)           Lab Orders:       97325  [facility] Lipid Panel  (In-House)         38941*  Hgb A1c fast lab  (In-House)           Procedures Ordered:       85419  Collection of capillary blood specimen (eg, finger, heel, ear stick)  (In-House)           Other Orders:         Depression screen negative  (In-House)         1101F  Pt screen for fall risk; document no falls in past year or only 1 fall w/o injury in past year (FENG)  (In-House)           Negative EtOH screen  (In-House)           EKG, performed as a screening with Welcome to Medicare (x1)         Welcome to Medicare, 1st 12 months of Medicare enrollment (x1)                 PLAN:          Yearly physical exam     Paris seems well today.  She has mostly normal labs earlier in the year.  However, we will repeat sugar and cholesterol today as well as get an EKG.  See attached wellness recommendations.         TESTS/PROCEDURES:  Will proceed with an ECG to be performed/scheduled now.  [facility] PHQ-9 Depression Screening: Completed form scanned and in chart; Total Score 0; Negative Depression Screen Negative alcohol screen           Orders:         Depression screen negative  (In-House)         1101F  Pt screen for fall risk; document no falls in past year or only 1 fall w/o injury in past year (FENG)  (In-House)           Negative EtOH screen  (In-House)         3014F  Screening mammography results documented and reviewed (PV)1  (In-House)         3017F  Colorectal CA screen results  documented and reviewed (PV)  (In-House)         95875  Electrocardiogram, routine with at least 12 leads; with interpretation and report  (In-House)                     EKG, performed as a screening with Welcome to Medicare (x1)           Patient Education Handouts:       Physical Exam 60+ year, Female           Hypertension           Prescriptions:       Refill of: Lisinopril 20mg Tablet Take 1 tablet(s) by mouth daily  #90 (Ninety) tablet(s) Refills: 1       Refill of: Metoprolol Succinate 100mg Tablets, Extended Release Take 1 tablet(s) by mouth daily  #90 (Ninety) tablet(s) Refills: 1          Hypercholesterolemia     LABORATORY:  Labs ordered to be performed today include lipid panel.            Orders:       [id number]  [facility] Lipid Panel  (In-House)            Hyperglycemia     LABORATORY:  Labs ordered to be performed today include Hgb A1c inhouse fast lab.            Orders:       78834*  Hgb A1c fast lab  (In-House)         63212  Collection of capillary blood specimen (eg, finger, heel, ear stick)  (In-House)               CHARGE CAPTURE           **Please note: ICD descriptions below are intended for billing purposes only and may not represent clinical diagnoses**        Primary Diagnosis:         V70.0 Yearly physical exam            Z00.01    Encounter for general adult medical examination with abnormal findings              Orders:          85682   Preventive medicine, established patient, age 65+ years  (In-House)                                           Welcome to Medicare, 1st 12 months of Medicare enrollment (x1)              Depression screen negative  (In-House)             1101F   Pt screen for fall risk; document no falls in past year or only 1 fall w/o injury in past year (FENG)  (In-House)                Negative EtOH screen  (In-House)             3014F   Screening mammography results documented and reviewed (PV)1  (In-House)             3017F   Colorectal CA screen  results documented and reviewed (PV)  (In-House)             83109   Electrocardiogram, routine with at least 12 leads; with interpretation and report  (In-House)                                           EKG, performed as a screening with Welcome to Medicare (x1)         401.1 Hypertension            I10    Essential (primary) hypertension              Orders:          20777 -25  Office/outpatient visit; established patient, level 3  (In-House)           272.0 Hypercholesterolemia            E78.2    Mixed hyperlipidemia              Orders:          16671   [facility] Lipid Panel  (In-House)           790.6 Hyperglycemia            R73.01    Impaired fasting glucose              Orders:          40202*   Hgb A1c fast lab  (In-House)             51714   Collection of capillary blood specimen (eg, finger, heel, ear stick)  (In-House)

## 2021-07-01 VITALS
BODY MASS INDEX: 33.65 KG/M2 | DIASTOLIC BLOOD PRESSURE: 64 MMHG | HEART RATE: 72 BPM | TEMPERATURE: 98.4 F | WEIGHT: 209.4 LBS | HEIGHT: 66 IN | SYSTOLIC BLOOD PRESSURE: 137 MMHG

## 2021-07-01 VITALS
BODY MASS INDEX: 34.97 KG/M2 | HEIGHT: 66 IN | WEIGHT: 217.6 LBS | SYSTOLIC BLOOD PRESSURE: 148 MMHG | HEART RATE: 72 BPM | TEMPERATURE: 98.5 F | DIASTOLIC BLOOD PRESSURE: 69 MMHG

## 2021-07-01 VITALS
HEIGHT: 66 IN | BODY MASS INDEX: 35.1 KG/M2 | TEMPERATURE: 99.3 F | WEIGHT: 218.4 LBS | DIASTOLIC BLOOD PRESSURE: 64 MMHG | SYSTOLIC BLOOD PRESSURE: 134 MMHG | HEART RATE: 84 BPM

## 2021-07-02 VITALS
HEIGHT: 66 IN | WEIGHT: 220 LBS | TEMPERATURE: 97.8 F | DIASTOLIC BLOOD PRESSURE: 72 MMHG | HEART RATE: 90 BPM | SYSTOLIC BLOOD PRESSURE: 165 MMHG | BODY MASS INDEX: 35.36 KG/M2

## 2021-10-06 ENCOUNTER — TELEPHONE (OUTPATIENT)
Dept: FAMILY MEDICINE CLINIC | Age: 66
End: 2021-10-06

## 2021-10-06 DIAGNOSIS — Z12.11 ENCOUNTER FOR SCREENING FOR MALIGNANT NEOPLASM OF COLON: Primary | ICD-10-CM

## 2021-10-14 ENCOUNTER — HOSPITAL ENCOUNTER (OUTPATIENT)
Dept: OTHER | Facility: HOSPITAL | Age: 66
Discharge: HOME | End: 2021-10-14

## 2022-02-10 RX ORDER — METOPROLOL SUCCINATE 100 MG/1
100 TABLET, EXTENDED RELEASE ORAL DAILY
Qty: 90 TABLET | Refills: 0 | Status: SHIPPED | OUTPATIENT
Start: 2022-02-10 | End: 2022-04-07 | Stop reason: SDUPTHER

## 2022-02-10 RX ORDER — LISINOPRIL 20 MG/1
20 TABLET ORAL DAILY
Qty: 90 TABLET | Refills: 0 | Status: SHIPPED | OUTPATIENT
Start: 2022-02-10 | End: 2022-04-07 | Stop reason: SDUPTHER

## 2022-02-10 RX ORDER — LISINOPRIL 20 MG/1
20 TABLET ORAL DAILY
COMMUNITY
End: 2022-02-10 | Stop reason: SDUPTHER

## 2022-04-07 ENCOUNTER — LAB (OUTPATIENT)
Dept: LAB | Facility: HOSPITAL | Age: 67
End: 2022-04-07

## 2022-04-07 ENCOUNTER — OFFICE VISIT (OUTPATIENT)
Dept: FAMILY MEDICINE CLINIC | Age: 67
End: 2022-04-07

## 2022-04-07 VITALS
BODY MASS INDEX: 35.36 KG/M2 | HEART RATE: 82 BPM | TEMPERATURE: 98.3 F | DIASTOLIC BLOOD PRESSURE: 72 MMHG | HEIGHT: 66 IN | SYSTOLIC BLOOD PRESSURE: 150 MMHG | WEIGHT: 220 LBS

## 2022-04-07 DIAGNOSIS — Z11.59 NEED FOR HEPATITIS C SCREENING TEST: ICD-10-CM

## 2022-04-07 DIAGNOSIS — E66.01 MORBID OBESITY: ICD-10-CM

## 2022-04-07 DIAGNOSIS — I10 ESSENTIAL HYPERTENSION: ICD-10-CM

## 2022-04-07 DIAGNOSIS — Z13.220 SCREENING CHOLESTEROL LEVEL: ICD-10-CM

## 2022-04-07 DIAGNOSIS — R73.9 HYPERGLYCEMIA: ICD-10-CM

## 2022-04-07 DIAGNOSIS — Z00.00 PHYSICAL EXAM: Primary | ICD-10-CM

## 2022-04-07 DIAGNOSIS — Z78.0 ASYMPTOMATIC POSTMENOPAUSAL STATE: ICD-10-CM

## 2022-04-07 DIAGNOSIS — R53.83 FATIGUE, UNSPECIFIED TYPE: ICD-10-CM

## 2022-04-07 LAB
ALBUMIN SERPL-MCNC: 4.2 G/DL (ref 3.5–5.2)
ALBUMIN/GLOB SERPL: 1.3 G/DL
ALP SERPL-CCNC: 100 U/L (ref 39–117)
ALT SERPL W P-5'-P-CCNC: 18 U/L (ref 1–33)
ANION GAP SERPL CALCULATED.3IONS-SCNC: 10.8 MMOL/L (ref 5–15)
AST SERPL-CCNC: 19 U/L (ref 1–32)
BILIRUB SERPL-MCNC: <0.2 MG/DL (ref 0–1.2)
BUN SERPL-MCNC: 14 MG/DL (ref 8–23)
BUN/CREAT SERPL: 16.9 (ref 7–25)
CALCIUM SPEC-SCNC: 10.3 MG/DL (ref 8.6–10.5)
CHLORIDE SERPL-SCNC: 101 MMOL/L (ref 98–107)
CHOLEST SERPL-MCNC: 215 MG/DL (ref 0–200)
CO2 SERPL-SCNC: 28.2 MMOL/L (ref 22–29)
CREAT SERPL-MCNC: 0.83 MG/DL (ref 0.57–1)
EGFRCR SERPLBLD CKD-EPI 2021: 76.9 ML/MIN/1.73
GLOBULIN UR ELPH-MCNC: 3.2 GM/DL
GLUCOSE SERPL-MCNC: 97 MG/DL (ref 65–99)
HBA1C MFR BLD: 7.1 % (ref 4.8–5.6)
HDLC SERPL-MCNC: 49 MG/DL (ref 40–60)
LDLC SERPL CALC-MCNC: 90 MG/DL (ref 0–100)
LDLC/HDLC SERPL: 1.49 {RATIO}
POTASSIUM SERPL-SCNC: 4.5 MMOL/L (ref 3.5–5.2)
PROT SERPL-MCNC: 7.4 G/DL (ref 6–8.5)
SODIUM SERPL-SCNC: 140 MMOL/L (ref 136–145)
T4 FREE SERPL-MCNC: 1.11 NG/DL (ref 0.93–1.7)
TRIGL SERPL-MCNC: 466 MG/DL (ref 0–150)
TSH SERPL DL<=0.05 MIU/L-ACNC: 1.56 UIU/ML (ref 0.27–4.2)
VLDLC SERPL-MCNC: 76 MG/DL (ref 5–40)

## 2022-04-07 PROCEDURE — G0439 PPPS, SUBSEQ VISIT: HCPCS | Performed by: FAMILY MEDICINE

## 2022-04-07 PROCEDURE — 1170F FXNL STATUS ASSESSED: CPT | Performed by: FAMILY MEDICINE

## 2022-04-07 PROCEDURE — 99214 OFFICE O/P EST MOD 30 MIN: CPT | Performed by: FAMILY MEDICINE

## 2022-04-07 PROCEDURE — 80053 COMPREHEN METABOLIC PANEL: CPT

## 2022-04-07 PROCEDURE — 83036 HEMOGLOBIN GLYCOSYLATED A1C: CPT

## 2022-04-07 PROCEDURE — 1125F AMNT PAIN NOTED PAIN PRSNT: CPT | Performed by: FAMILY MEDICINE

## 2022-04-07 PROCEDURE — 86803 HEPATITIS C AB TEST: CPT

## 2022-04-07 PROCEDURE — 84443 ASSAY THYROID STIM HORMONE: CPT

## 2022-04-07 PROCEDURE — 1159F MED LIST DOCD IN RCRD: CPT | Performed by: FAMILY MEDICINE

## 2022-04-07 PROCEDURE — 36415 COLL VENOUS BLD VENIPUNCTURE: CPT

## 2022-04-07 PROCEDURE — 84439 ASSAY OF FREE THYROXINE: CPT

## 2022-04-07 PROCEDURE — 80061 LIPID PANEL: CPT

## 2022-04-07 RX ORDER — METOPROLOL SUCCINATE 100 MG/1
100 TABLET, EXTENDED RELEASE ORAL DAILY
Qty: 90 TABLET | Refills: 3 | Status: SHIPPED | OUTPATIENT
Start: 2022-04-07

## 2022-04-07 RX ORDER — TAMOXIFEN CITRATE 20 MG/1
1 TABLET ORAL DAILY
COMMUNITY
Start: 2022-02-18

## 2022-04-07 RX ORDER — LISINOPRIL 20 MG/1
20 TABLET ORAL DAILY
Qty: 90 TABLET | Refills: 3 | Status: SHIPPED | OUTPATIENT
Start: 2022-04-07

## 2022-04-07 NOTE — PROGRESS NOTES
The ABCs of the Annual Wellness Visit  Subsequent Medicare Wellness Visit    Chief Complaint:  Wellness exam, blood pressure    Subjective    History of Present Illness:  Paris Wright is a 67 y.o. female who presents for a Subsequent Medicare Wellness Visit.    The following portions of the patient's history were reviewed and updated as appropriate: allergies, current medications, past family history, past medical history, past social history, past surgical history and problem list.     Compared to one year ago, the patient feels her physical health is worse.  She says that she is tired a lot.    Compared to one year ago, the patient feels her mental health is the same.    Recent Hospitalizations:  She was admitted within the past 365 days at [facility].       Current Medical Providers:  Patient Care Team:  Saleem Jimenez MD as PCP - General (Family Medicine)    Outpatient Medications Prior to Visit   Medication Sig Dispense Refill   • calcium carbonate-vitamin d 600-400 MG-UNIT per tablet Take 1 tablet by mouth Daily.     • Cholecalciferol 50 MCG (2000 UT) capsule Take 1 capsule by mouth Daily.     • tamoxifen (NOLVADEX) 20 MG chemo tablet Take 1 tablet by mouth Daily.     • lisinopril (PRINIVIL,ZESTRIL) 20 MG tablet Take 1 tablet by mouth Daily. 90 tablet 0   • metoprolol succinate XL (Toprol XL) 100 MG 24 hr tablet Take 1 tablet by mouth Daily. 90 tablet 0     No facility-administered medications prior to visit.       No opioid medication identified on active medication list. I have reviewed chart for other potential  high risk medication/s and harmful drug interactions in the elderly.          Aspirin is not on active medication list.  Aspirin use is not indicated based on review of current medical condition/s. Risk of harm outweighs potential benefits.  .    Patient Active Problem List   Diagnosis   • Essential hypertension   • Morbid obesity (HCC)     Advance Care Planning   Advance Directive  "is not on file.  ACP discussion was held with the patient during this visit. Patient has an advance directive (not in EMR), copy requested.  Her , Bao, would make decisions if needed.    In addition, Paris is here for follow-up on her blood pressure.  She currently takes metoprolol and lisinopril for this.  She does not check her blood pressure routinely.  It is moderately elevated here, and it was also moderately elevated at her most recent office visit.      Review of Systems:  Review of Systems   Constitutional: Positive for fatigue. Negative for chills and fever.   Respiratory: Positive for shortness of breath (with moderate exertion since COVID). Negative for cough.    Cardiovascular: Negative for chest pain and palpitations.   Gastrointestinal: Negative for abdominal pain, nausea and vomiting.         Objective       Vitals:    04/07/22 1338   BP: 165/68   BP Location: Left arm   Patient Position: Sitting   Pulse: 76   Temp: 98.3 °F (36.8 °C)   TempSrc: Oral   Weight: 99.8 kg (220 lb)   Height: 166.4 cm (65.51\")   PainSc:   5   PainLoc: Knee     BMI Readings from Last 1 Encounters:   04/07/22 36.04 kg/m²   BMI is above normal parameters. Recommendations include: exercise counseling and nutrition counseling    Does the patient have evidence of cognitive impairment? No    Physical Exam  Vitals and nursing note reviewed.   Constitutional:       General: She is not in acute distress.     Appearance: She is obese. She is not ill-appearing.   HENT:      Right Ear: Tympanic membrane and ear canal normal.      Left Ear: Tympanic membrane and ear canal normal.      Ears:      Comments: Hearing is normal bilaterally with forced whisper.     Mouth/Throat:      Mouth: Mucous membranes are moist.      Comments: Pharynx appears normal  Eyes:      Extraocular Movements: Extraocular movements intact.      Pupils: Pupils are equal, round, and reactive to light.      Comments: Binocular vision is 20/20 with correction. "   Neck:      Thyroid: No thyromegaly.   Cardiovascular:      Rate and Rhythm: Normal rate and regular rhythm.      Heart sounds: No murmur heard.  Pulmonary:      Effort: Pulmonary effort is normal.      Breath sounds: Normal breath sounds.   Abdominal:      General: There is no distension.      Palpations: Abdomen is soft. There is no mass.      Tenderness: There is no abdominal tenderness.   Musculoskeletal:      Cervical back: Normal range of motion.   Skin:     Findings: No lesion or rash.      Comments: Some generalized thinning of the hair is present.   Neurological:      General: No focal deficit present.      Mental Status: She is oriented to person, place, and time.      Cranial Nerves: No cranial nerve deficit.   Psychiatric:         Mood and Affect: Mood normal.       The following data was reviewed by Saleem Jimenez MD on 04/07/2022.  No results found for: WBC, HGB, HCT, MCV, PLT      No results found for: CHOL, CHLPL, TRIG, HDL, LDL, LDLDIRECT  No results found for: TSH  No results found for: HGBA1C  No results found for: PSA       HEALTH RISK ASSESSMENT    Smoking Status:  Social History     Tobacco Use   Smoking Status Never Smoker   Smokeless Tobacco Never Used   Tobacco Comment    Never smoked     Alcohol Consumption:  Social History     Substance and Sexual Activity   Alcohol Use Not Currently     Fall Risk Screen:    STEADI Fall Risk Assessment was completed, and patient is at LOW risk for falls.Assessment completed on:4/7/2022    Depression Screening:  PHQ-2/PHQ-9 Depression Screening 4/7/2022   Little Interest or Pleasure in Doing Things 0-->not at all   Feeling Down, Depressed or Hopeless 0-->not at all   PHQ-9: Brief Depression Severity Measure Score 0       Health Habits and Functional and Cognitive Screening:  Functional & Cognitive Status 4/7/2022   Do you have difficulty preparing food and eating? No   Do you have difficulty bathing yourself, getting dressed or grooming yourself?  No   Do you have difficulty using the toilet? No   Do you have difficulty moving around from place to place? No   Do you have trouble with steps or getting out of a bed or a chair? No   Current Diet Unhealthy Diet   Dental Exam Not up to date   Eye Exam Up to date   Exercise (times per week) 0 times per week   Current Exercises Include No Regular Exercise   Do you need help using the phone?  No   Are you deaf or do you have serious difficulty hearing?  No   Do you need help with transportation? No   Do you need help shopping? No   Do you need help preparing meals?  No   Do you need help with housework?  No   Do you need help with laundry? No   Do you need help taking your medications? No   Do you need help managing money? No   Do you ever drive or ride in a car without wearing a seat belt? No   Have you felt unusual stress, anger or loneliness in the last month? No   Who do you live with? Spouse   If you need help, do you have trouble finding someone available to you? No   Have you been bothered in the last four weeks by sexual problems? No   Do you have difficulty concentrating, remembering or making decisions? No       Age-appropriate Screening Schedule:  Refer to the list below for future screening recommendations based on patient's age, sex and/or medical conditions. Orders for these recommended tests are listed in the plan section. The patient has been provided with a written plan.    Health Maintenance   Topic Date Due   • DXA SCAN  Never done   • TDAP/TD VACCINES (1 - Tdap) Never done   • ZOSTER VACCINE (1 of 2) Never done   • MAMMOGRAM  09/23/2021   • INFLUENZA VACCINE  08/01/2022                       Assessment and Plan:       CMS Preventative Services Quick Reference  Risk Factors Identified During Encounter  Cardiovascular Disease  Immunizations Discussed/Encouraged (specific Immunizations; Tdap, Pneumococcal 23, Shingrix and COVID19  Inactivity/Sedentary  Obesity/Overweight     The above risks/problems  have been discussed with the patient.  Follow up actions/plans if indicated are seen below in the Assessment/Plan Section.  Pertinent information has been shared with the patient in the After Visit Summary.    Today, we have reviewed Paris's care.  She is up-to-date on cancer screenings.  She declined Pneumovax and COVID-19 vaccines today.  I also recommended she consider shingles vaccine if it is affordable.  Tetanus booster would be recommended if she had a cut or scrape particularly with susan metal.  We will reach out to [facility] for a copy of her most recent mammogram.  DEXA scan will be ordered.  Regarding her blood pressure, it is moderately elevated here today.  I suspect it is running higher than we want over the long-term.  We will repeat her blood pressure and update labs as noted below.  I anticipate adding treatment for blood pressure.  I would lean toward a diuretic at this point.    Diagnoses and all orders for this visit:    1. Physical exam (Primary)    2. Essential hypertension  -     Comprehensive Metabolic Panel; Future  -     lisinopril (PRINIVIL,ZESTRIL) 20 MG tablet; Take 1 tablet by mouth Daily.  Dispense: 90 tablet; Refill: 3  -     metoprolol succinate XL (Toprol XL) 100 MG 24 hr tablet; Take 1 tablet by mouth Daily.  Dispense: 90 tablet; Refill: 3    3. Fatigue, unspecified type  -     TSH+Free T4; Future    4. Morbid obesity (HCC)  -     TSH+Free T4; Future    5. Screening cholesterol level  -     Lipid Panel; Future    6. Hyperglycemia  -     Hemoglobin A1c; Future    7. Need for hepatitis C screening test  -     Hepatitis C Antibody; Future    8. Asymptomatic postmenopausal state  -     DEXA Bone Density Axial; Future        Follow Up:   Return in about 1 year (around 4/7/2023) for Recheck, Medicare Wellness.     An After Visit Summary and PPPS were given to the patient.

## 2022-04-08 ENCOUNTER — TELEPHONE (OUTPATIENT)
Dept: FAMILY MEDICINE CLINIC | Age: 67
End: 2022-04-08

## 2022-04-08 DIAGNOSIS — I10 ESSENTIAL HYPERTENSION: Primary | ICD-10-CM

## 2022-04-08 LAB — HCV AB SER DONR QL: NORMAL

## 2022-04-08 RX ORDER — HYDROCHLOROTHIAZIDE 12.5 MG/1
12.5 CAPSULE, GELATIN COATED ORAL DAILY
Qty: 90 CAPSULE | Refills: 1 | Status: SHIPPED | OUTPATIENT
Start: 2022-04-08 | End: 2022-09-22

## 2022-04-08 NOTE — TELEPHONE ENCOUNTER
Caller: Paris Wright    Relationship: Self    Best call back number: [phone]    What orders are you requesting (i.e. lab or imaging): DEXA BONE DENSITY TEST    In what timeframe would the patient need to come in: ASAP    Where will you receive your lab/imaging services: [facility]    Additional notes: PATIENT IS CLOSER TO [facility] AND WOULD LIKE TO HAVE HER TEST DONE THERE.

## 2022-09-22 DIAGNOSIS — I10 ESSENTIAL HYPERTENSION: ICD-10-CM

## 2022-09-22 RX ORDER — HYDROCHLOROTHIAZIDE 12.5 MG/1
CAPSULE, GELATIN COATED ORAL
Qty: 90 CAPSULE | Refills: 0 | Status: SHIPPED | OUTPATIENT
Start: 2022-09-22 | End: 2022-12-21

## 2022-10-17 ENCOUNTER — HOSPITAL ENCOUNTER (OUTPATIENT)
Dept: OTHER | Facility: HOSPITAL | Age: 67
Discharge: HOME | End: 2022-10-17

## 2022-11-11 ENCOUNTER — TELEPHONE (OUTPATIENT)
Dept: FAMILY MEDICINE CLINIC | Age: 67
End: 2022-11-11

## 2022-11-11 NOTE — TELEPHONE ENCOUNTER
----- Message from Lluvia Olsen LPN sent at 5/3/2022  9:06 AM EDT -----      ----- Message -----  From: SYSTEM  Sent: 5/3/2022   1:13 AM EDT  To: [facility] [place] [facility] [place]

## 2022-11-28 ENCOUNTER — TELEPHONE (OUTPATIENT)
Dept: FAMILY MEDICINE CLINIC | Age: 67
End: 2022-11-28

## 2022-11-28 NOTE — TELEPHONE ENCOUNTER
Caller: Maile Wright    Relationship: Self    Best call back number: [phone]    Who are you requesting to speak with (clinical staff, provider,  specific staff member): CLINICAL    What was the call regarding: PATIENT STATES SHE HAD A DEXA SCAN DONE BY DR RETANA AT [facility]. PATIENT STATES SHE CAN MAIL US THE RESULTS, BUT SHE KEEPS RECEIVING CALLS TO SCHEDULE A DEXA WITH [facility].     Do you require a callback: IF NECESSARY

## 2022-12-21 DIAGNOSIS — I10 ESSENTIAL HYPERTENSION: ICD-10-CM

## 2022-12-21 RX ORDER — HYDROCHLOROTHIAZIDE 12.5 MG/1
CAPSULE, GELATIN COATED ORAL
Qty: 90 CAPSULE | Refills: 0 | Status: SHIPPED | OUTPATIENT
Start: 2022-12-21 | End: 2023-03-21

## 2023-02-09 ENCOUNTER — HOSPITAL ENCOUNTER (OUTPATIENT)
Dept: GENERAL RADIOLOGY | Facility: HOSPITAL | Age: 68
Discharge: HOME | End: 2023-02-09
Payer: MEDICARE

## 2023-02-09 ENCOUNTER — TRANSCRIBE ORDERS (OUTPATIENT)
Dept: ADMINISTRATIVE | Facility: HOSPITAL | Age: 68
End: 2023-02-09
Payer: MEDICARE

## 2023-02-09 ENCOUNTER — TRANSCRIBE ORDERS (OUTPATIENT)
Dept: CARDIOLOGY | Facility: HOSPITAL | Age: 68
End: 2023-02-09
Payer: MEDICARE

## 2023-02-09 ENCOUNTER — LAB (OUTPATIENT)
Dept: LAB | Facility: HOSPITAL | Age: 68
End: 2023-02-09
Payer: MEDICARE

## 2023-02-09 ENCOUNTER — HOSPITAL ENCOUNTER (OUTPATIENT)
Dept: CARDIOLOGY | Facility: HOSPITAL | Age: 68
Discharge: HOME | End: 2023-02-09
Payer: MEDICARE

## 2023-02-09 DIAGNOSIS — Z01.811 PRE-OP CHEST EXAM: Primary | ICD-10-CM

## 2023-02-09 DIAGNOSIS — M25.561 RIGHT KNEE PAIN, UNSPECIFIED CHRONICITY: Primary | ICD-10-CM

## 2023-02-09 DIAGNOSIS — Z01.811 PRE-OP CHEST EXAM: ICD-10-CM

## 2023-02-09 DIAGNOSIS — M25.561 RIGHT KNEE PAIN, UNSPECIFIED CHRONICITY: ICD-10-CM

## 2023-02-09 LAB
ALBUMIN SERPL-MCNC: 4.1 G/DL (ref 3.5–5.2)
ALBUMIN/GLOB SERPL: 1.5 G/DL
ALP SERPL-CCNC: 98 U/L (ref 39–117)
ALT SERPL W P-5'-P-CCNC: 20 U/L (ref 1–33)
ANION GAP SERPL CALCULATED.3IONS-SCNC: 8 MMOL/L (ref 5–15)
AST SERPL-CCNC: 21 U/L (ref 1–32)
BASOPHILS # BLD AUTO: 0.02 10*3/MM3 (ref 0–0.2)
BASOPHILS NFR BLD AUTO: 0.3 % (ref 0–1.5)
BILIRUB SERPL-MCNC: 0.2 MG/DL (ref 0–1.2)
BILIRUB UR QL STRIP: NEGATIVE
BUN SERPL-MCNC: 11 MG/DL (ref 8–23)
BUN/CREAT SERPL: 15.5 (ref 7–25)
CALCIUM SPEC-SCNC: 9.2 MG/DL (ref 8.6–10.5)
CHLORIDE SERPL-SCNC: 105 MMOL/L (ref 98–107)
CLARITY UR: CLEAR
CO2 SERPL-SCNC: 28 MMOL/L (ref 22–29)
COLOR UR: YELLOW
CREAT SERPL-MCNC: 0.71 MG/DL (ref 0.57–1)
DEPRECATED RDW RBC AUTO: 41.9 FL (ref 37–54)
EGFRCR SERPLBLD CKD-EPI 2021: 92.2 ML/MIN/1.73
EOSINOPHIL # BLD AUTO: 0.06 10*3/MM3 (ref 0–0.4)
EOSINOPHIL NFR BLD AUTO: 1 % (ref 0.3–6.2)
ERYTHROCYTE [DISTWIDTH] IN BLOOD BY AUTOMATED COUNT: 13.1 % (ref 12.3–15.4)
GLOBULIN UR ELPH-MCNC: 2.7 GM/DL
GLUCOSE SERPL-MCNC: 116 MG/DL (ref 65–99)
GLUCOSE UR STRIP-MCNC: NEGATIVE MG/DL
HCT VFR BLD AUTO: 38.1 % (ref 34–46.6)
HGB BLD-MCNC: 12.6 G/DL (ref 12–15.9)
HGB UR QL STRIP.AUTO: NEGATIVE
IMM GRANULOCYTES # BLD AUTO: 0.01 10*3/MM3 (ref 0–0.05)
IMM GRANULOCYTES NFR BLD AUTO: 0.2 % (ref 0–0.5)
KETONES UR QL STRIP: NEGATIVE
LEUKOCYTE ESTERASE UR QL STRIP.AUTO: NEGATIVE
LYMPHOCYTES # BLD AUTO: 1.92 10*3/MM3 (ref 0.7–3.1)
LYMPHOCYTES NFR BLD AUTO: 33.1 % (ref 19.6–45.3)
MCH RBC QN AUTO: 28.9 PG (ref 26.6–33)
MCHC RBC AUTO-ENTMCNC: 33.1 G/DL (ref 31.5–35.7)
MCV RBC AUTO: 87.4 FL (ref 79–97)
MONOCYTES # BLD AUTO: 0.36 10*3/MM3 (ref 0.1–0.9)
MONOCYTES NFR BLD AUTO: 6.2 % (ref 5–12)
NEUTROPHILS NFR BLD AUTO: 3.43 10*3/MM3 (ref 1.7–7)
NEUTROPHILS NFR BLD AUTO: 59.2 % (ref 42.7–76)
NITRITE UR QL STRIP: NEGATIVE
NRBC BLD AUTO-RTO: 0 /100 WBC (ref 0–0.2)
PH UR STRIP.AUTO: <=5 [PH] (ref 5–8)
PLATELET # BLD AUTO: 193 10*3/MM3 (ref 140–450)
PMV BLD AUTO: 10.7 FL (ref 6–12)
POTASSIUM SERPL-SCNC: 4.3 MMOL/L (ref 3.5–5.2)
PROT SERPL-MCNC: 6.8 G/DL (ref 6–8.5)
PROT UR QL STRIP: NEGATIVE
QT INTERVAL: 363 MS
RBC # BLD AUTO: 4.36 10*6/MM3 (ref 3.77–5.28)
SODIUM SERPL-SCNC: 141 MMOL/L (ref 136–145)
SP GR UR STRIP: 1.02 (ref 1–1.03)
UROBILINOGEN UR QL STRIP: NORMAL
WBC NRBC COR # BLD: 5.8 10*3/MM3 (ref 3.4–10.8)

## 2023-02-09 PROCEDURE — 81003 URINALYSIS AUTO W/O SCOPE: CPT

## 2023-02-09 PROCEDURE — 80053 COMPREHEN METABOLIC PANEL: CPT

## 2023-02-09 PROCEDURE — 93005 ELECTROCARDIOGRAM TRACING: CPT

## 2023-02-09 PROCEDURE — 36415 COLL VENOUS BLD VENIPUNCTURE: CPT

## 2023-02-09 PROCEDURE — 93010 ELECTROCARDIOGRAM REPORT: CPT | Performed by: STUDENT IN AN ORGANIZED HEALTH CARE EDUCATION/TRAINING PROGRAM

## 2023-02-09 PROCEDURE — 85025 COMPLETE CBC W/AUTO DIFF WBC: CPT

## 2023-02-09 PROCEDURE — 71046 X-RAY EXAM CHEST 2 VIEWS: CPT

## 2023-03-20 ENCOUNTER — READMISSION MANAGEMENT (OUTPATIENT)
Dept: CALL CENTER | Facility: HOSPITAL | Age: 68
End: 2023-03-20
Payer: MEDICARE

## 2023-03-20 NOTE — OUTREACH NOTE
Prep Survey    Flowsheet Row Responses   [facility] facility patient discharged from? Non-[facility]   Is LACE score < 7 ? Non- Discharge   Eligibility [facility]   Date of Admission 03/14/23   Date of Discharge 03/19/23   Discharge diagnosis endometdrial cancer   Does the patient have one of the following disease processes/diagnoses(primary or secondary)? Other   Prep survey completed? Yes          Melanie PARRY - Registered Nurse

## 2023-03-21 ENCOUNTER — TRANSITIONAL CARE MANAGEMENT TELEPHONE ENCOUNTER (OUTPATIENT)
Dept: CALL CENTER | Facility: HOSPITAL | Age: 68
End: 2023-03-21
Payer: MEDICARE

## 2023-03-21 DIAGNOSIS — I10 ESSENTIAL HYPERTENSION: ICD-10-CM

## 2023-03-21 RX ORDER — HYDROCHLOROTHIAZIDE 12.5 MG/1
CAPSULE, GELATIN COATED ORAL
Qty: 90 CAPSULE | Refills: 0 | Status: SHIPPED | OUTPATIENT
Start: 2023-03-21

## 2023-03-21 NOTE — OUTREACH NOTE
Call Center TCM Note    Flowsheet Row Responses   [facility] patient discharged from? Non-   Does the patient have one of the following disease processes/diagnoses(primary or secondary)? Other   TCM attempt successful? Yes   Call start time 0900   Call end time 0912   Discharge diagnosis endometdrial cancer   Person spoke with today (if not patient) and relationship patient   Meds reviewed with patient/caregiver? Yes   Is the patient having any side effects they believe may be caused by any medication additions or changes? No   Does the patient have all medications ordered at discharge? Yes   Is the patient taking all medications as directed (includes completed medication regime)? Yes   Comments Patient has a wellness visit with Dr Jimenez on 4/10/23 and confirmed appt. If she has needs or develops issues she will call office.    Does the patient have an appointment with their PCP within 7 days of discharge? No   Nursing Interventions Patient desires to follow up with specialty only, Patient declined scheduling/rescheduling appointment at this time, Confirmed date/time of appointment   Psychosocial issues? No   Did the patient receive a copy of their discharge instructions? Yes   Nursing interventions Reviewed instructions with patient   What is the patient's perception of their health status since discharge? Improving   Is the patient/caregiver able to teach back signs and symptoms related to disease process for when to call PCP? Yes   Is the patient/caregiver able to teach back signs and symptoms related to disease process for when to call 911? Yes   Is the patient/caregiver able to teach back the hierarchy of who to call/visit for symptoms/problems? PCP, Specialist, Home health nurse, Urgent Care, ED, 911 Yes   TCM call completed? Yes   Wrap up additional comments Patient was discharged from [place] status post lap hysterectomy, salpingo oopherectomy, repair of colotomy and sigmoidoscopy. She is doing  well. Slight pain at incision sites.    Call end time 0912   Would this patient benefit from a Referral to [facility] Social Work? No   Is the patient interested in additional calls from an ambulatory ?  NOTE:  applies to high risk patients requiring additional follow-up. No          Oliver Le RN    3/21/2023, 09:14 EDT

## 2023-04-10 ENCOUNTER — OFFICE VISIT (OUTPATIENT)
Dept: FAMILY MEDICINE CLINIC | Age: 68
End: 2023-04-10
Payer: MEDICARE

## 2023-04-10 VITALS
SYSTOLIC BLOOD PRESSURE: 135 MMHG | TEMPERATURE: 98.1 F | HEIGHT: 66 IN | HEART RATE: 88 BPM | BODY MASS INDEX: 33.75 KG/M2 | DIASTOLIC BLOOD PRESSURE: 67 MMHG | WEIGHT: 210 LBS

## 2023-04-10 DIAGNOSIS — R53.83 FATIGUE, UNSPECIFIED TYPE: ICD-10-CM

## 2023-04-10 DIAGNOSIS — E11.9 TYPE 2 DIABETES MELLITUS WITHOUT COMPLICATION, WITHOUT LONG-TERM CURRENT USE OF INSULIN: ICD-10-CM

## 2023-04-10 DIAGNOSIS — I10 ESSENTIAL HYPERTENSION: ICD-10-CM

## 2023-04-10 DIAGNOSIS — D64.9 ANEMIA, UNSPECIFIED TYPE: ICD-10-CM

## 2023-04-10 DIAGNOSIS — Z00.00 PHYSICAL EXAM: Primary | ICD-10-CM

## 2023-04-10 RX ORDER — LISINOPRIL 20 MG/1
20 TABLET ORAL DAILY
Qty: 90 TABLET | Refills: 3 | Status: SHIPPED | OUTPATIENT
Start: 2023-04-10

## 2023-04-10 RX ORDER — METOPROLOL SUCCINATE 100 MG/1
100 TABLET, EXTENDED RELEASE ORAL DAILY
Qty: 90 TABLET | Refills: 3 | Status: SHIPPED | OUTPATIENT
Start: 2023-04-10

## 2023-04-10 NOTE — PROGRESS NOTES
The ABCs of the Annual Wellness Visit  Subsequent Medicare Wellness Visit    Subjective    Maile Wright is a 68 y.o. female who presents for a Subsequent Medicare Wellness Visit.    The following portions of the patient's history were reviewed and updated as appropriate: allergies, current medications, past family history, past medical history, past social history, past surgical history and problem list.    Compared to one year ago, the patient feels her physical health is worse.  She is still recovering from these surgeries.    Compared to one year ago, the patient feels her mental health is the same.    Recent Hospitalizations:  She was admitted within the past 365 days at [place] for hysterectomy.    Current Medical Providers:  Patient Care Team:  Saleem Jimenez MD as PCP - General (Family Medicine)  Gin Burnett MD as Consulting Physician (Obstetrics and Gynecology)  Matthew Rothman MD (Hematology and Oncology)    Outpatient Medications Prior to Visit   Medication Sig Dispense Refill   • calcium carbonate-vitamin d 600-400 MG-UNIT per tablet Take 1 tablet by mouth Daily.     • Cholecalciferol 50 MCG (2000 UT) capsule Take 1 capsule by mouth Daily.     • lisinopril (PRINIVIL,ZESTRIL) 20 MG tablet Take 1 tablet by mouth Daily. 90 tablet 3   • metoprolol succinate XL (Toprol XL) 100 MG 24 hr tablet Take 1 tablet by mouth Daily. 90 tablet 3   • hydroCHLOROthiazide (MICROZIDE) 12.5 MG capsule TAKE 1 CAPSULE DAILY 90 capsule 0   • tamoxifen (NOLVADEX) 20 MG chemo tablet Take 1 tablet by mouth Daily.       No facility-administered medications prior to visit.       No opioid medication identified on active medication list. I have reviewed chart for other potential  high risk medication/s and harmful drug interactions in the elderly.          Aspirin is not on active medication list.  Aspirin use is not indicated based on review of current medical condition/s. Risk of harm outweighs potential  "benefits.  .    Patient Active Problem List   Diagnosis   • Essential hypertension   • Morbid obesity   • Type 2 diabetes mellitus without complication, without long-term current use of insulin     Advance Care Planning  Advance Directive is not on file.  ACP discussion was held with the patient during this visit. Patient has an advance directive (not in EMR), copy requested.  Her , Bao, would make decisions if needed.     Objective    Vitals:    04/10/23 1338   BP: 135/67   BP Location: Left arm   Patient Position: Sitting   Pulse: 88   Temp: 98.1 °F (36.7 °C)   TempSrc: Oral   Weight: 95.3 kg (210 lb)   Height: 166.4 cm (65.51\")     Estimated body mass index is 34.4 kg/m² as calculated from the following:    Height as of this encounter: 166.4 cm (65.51\").    Weight as of this encounter: 95.3 kg (210 lb).    BMI is >= 30 and <35. (Class 1 Obesity). The following options were offered after discussion;: exercise counseling/recommendations and nutrition counseling/recommendations    Does the patient have evidence of cognitive impairment? No          HEALTH RISK ASSESSMENT    Smoking Status:  Social History     Tobacco Use   Smoking Status Never   Smokeless Tobacco Never   Tobacco Comments    Never smoked     Alcohol Consumption:  Social History     Substance and Sexual Activity   Alcohol Use Not Currently     Fall Risk Screen:    STEADI Fall Risk Assessment was completed, and patient is at LOW risk for falls.Assessment completed on:4/10/2023    Depression Screening:  PHQ-2/PHQ-9 Depression Screening 4/10/2023   Little Interest or Pleasure in Doing Things 0-->not at all   Feeling Down, Depressed or Hopeless 0-->not at all   PHQ-9: Brief Depression Severity Measure Score 0       Health Habits and Functional and Cognitive Screening:  Functional & Cognitive Status 4/10/2023   Do you have difficulty preparing food and eating? No   Do you have difficulty bathing yourself, getting dressed or grooming yourself? No "   Do you have difficulty using the toilet? No   Do you have difficulty moving around from place to place? No   Do you have trouble with steps or getting out of a bed or a chair? No   Current Diet Well Balanced Diet   Dental Exam Not up to date   Eye Exam Not up to date   Exercise (times per week) 2 times per week   Current Exercises Include Yard Work;Other        Exercise Comment rehab   Do you need help using the phone?  No   Are you deaf or do you have serious difficulty hearing?  No   Do you need help with transportation? No   Do you need help shopping? No   Do you need help preparing meals?  No   Do you need help with housework?  No   Do you need help with laundry? No   Do you need help taking your medications? No   Do you need help managing money? No   Do you ever drive or ride in a car without wearing a seat belt? No   Have you felt unusual stress, anger or loneliness in the last month? No   Who do you live with? Spouse   If you need help, do you have trouble finding someone available to you? No   Have you been bothered in the last four weeks by sexual problems? No   Do you have difficulty concentrating, remembering or making decisions? No       Age-appropriate Screening Schedule:  Refer to the list below for future screening recommendations based on patient's age, sex and/or medical conditions. Orders for these recommended tests are listed in the plan section. The patient has been provided with a written plan.    Health Maintenance   Topic Date Due   • TDAP/TD VACCINES (1 - Tdap) Never done   • ZOSTER VACCINE (1 of 2) Never done   • COVID-19 Vaccine (1) 04/09/2024 (Originally 1955)   • Pneumococcal Vaccine 65+ (1 - PCV) 04/10/2024 (Originally 1/28/2019)   • INFLUENZA VACCINE  08/01/2023   • ANNUAL WELLNESS VISIT  04/10/2024   • MAMMOGRAM  10/17/2024   • DXA SCAN  10/18/2024   • COLORECTAL CANCER SCREENING  12/13/2024   • HEPATITIS C SCREENING  Completed                CMS Preventative Services Quick  Reference  Risk Factors Identified During Encounter  Immunizations Discussed/Encouraged: Tdap, Prevnar 20 (Pneumococcal 20-valent conjugate), Shingrix and COVID19  The above risks/problems have been discussed with the patient.  Pertinent information has been shared with the patient in the After Visit Summary.  An After Visit Summary and PPPS were made available to the patient.    Follow Up:   Next Medicare Wellness visit to be scheduled in 1 year.       Additional E&M Note during same encounter follows:  Patient has multiple medical problems which are significant and separately identifiable that require additional work above and beyond the Medicare Wellness Visit.      Chief Complaint:  Type 2 diabetes, blood pressure, fatigue    Subjective        In addition to the Medicare wellness exam, Paris is here for follow up on her usual care.  She has type 2 diabetes and is currently on no medication for this.  She does not routinely check her blood sugars.  It has been sometime since she had an A1c checked.    Regarding her blood pressure, Paris continues on lisinopril and metoprolol succinate as noted.  Her blood pressure is in a good range here.  She says that it has been trending toward being a little low if anything.  She stopped hydrochlorothiazide along the way due to urinary frequency.    She continues to feel fatigued.  She says this has been an issue since she had surgery.  She was noted on most recent testing to have a fairly significant anemia with a hemoglobin of 9.3.  She does have an upcoming appointment with Dr. Stephan navarro.    Review of Systems:  Review of Systems   Constitutional: Negative for chills and fever.   Respiratory: Positive for shortness of breath (possibly from fatigue). Negative for cough.    Cardiovascular: Negative for chest pain and palpitations.   Gastrointestinal: Negative for abdominal pain, nausea and vomiting.      Objective   Vital Signs:  /67 (BP Location: Left arm,  "Patient Position: Sitting)   Pulse 88   Temp 98.1 °F (36.7 °C) (Oral)   Ht 166.4 cm (65.51\")   Wt 95.3 kg (210 lb)   BMI 34.40 kg/m²     Physical Exam  Vitals and nursing note reviewed.   Constitutional:       General: She is not in acute distress.     Appearance: She is obese. She is not ill-appearing.   HENT:      Right Ear: Tympanic membrane and ear canal normal.      Left Ear: Tympanic membrane and ear canal normal.      Ears:      Comments: Hearing is normal with forced whisper bilaterally.     Mouth/Throat:      Mouth: Mucous membranes are moist.      Comments: Pharynx appears normal  Eyes:      Extraocular Movements: Extraocular movements intact.      Pupils: Pupils are equal, round, and reactive to light.      Comments: Binocular vision is 20/30 with correction.   Neck:      Thyroid: No thyromegaly.   Cardiovascular:      Rate and Rhythm: Normal rate and regular rhythm.      Heart sounds: No murmur heard.  Pulmonary:      Effort: Pulmonary effort is normal.      Breath sounds: Normal breath sounds.   Abdominal:      General: There is no distension.      Palpations: Abdomen is soft. There is no mass.      Tenderness: There is no abdominal tenderness.   Musculoskeletal:      Cervical back: Normal range of motion.   Skin:     Findings: No lesion or rash.   Neurological:      General: No focal deficit present.      Mental Status: She is oriented to person, place, and time.      Cranial Nerves: No cranial nerve deficit.   Psychiatric:         Mood and Affect: Mood normal.       The following data was reviewed by Saleem Jimenez MD on 04/10/2023.  Lab Results   Component Value Date    WBC 5.53 03/19/2023    HGB 9.6 (L) 03/19/2023    HCT 31.2 (L) 03/19/2023    MCV 89.7 03/19/2023     03/19/2023     Lab Results   Component Value Date    GLUCOSE 116 (H) 02/09/2023    BUN 11 02/09/2023    CREATININE 0.71 02/09/2023     02/09/2023    K 4.3 02/09/2023     02/09/2023    CO2 28.0 02/09/2023 "    CALCIUM 9.2 02/09/2023    PROTEINTOT 6.8 02/09/2023    ALBUMIN 4.1 02/09/2023    ALT 20 02/09/2023    AST 21 02/09/2023    ALKPHOS 98 02/09/2023    BILITOT 0.2 02/09/2023    EGFR 92.2 02/09/2023    GLOB 2.7 02/09/2023    AGRATIO 1.5 02/09/2023    BCR 15.5 02/09/2023    ANIONGAP 8.0 02/09/2023      Lab Results   Component Value Date    CHOL 215 (H) 04/07/2022    TRIG 466 (H) 04/07/2022    HDL 49 04/07/2022    LDL 90 04/07/2022     Lab Results   Component Value Date    TSH 1.615 01/26/2023     Lab Results   Component Value Date    HGBA1C 7.10 (H) 04/07/2022               Assessment and Plan:       Today, we have reviewed her care.  Regarding the wellness exam, Paris is basically up-to-date on needed cancer screenings.  She will be following up with Dr. Rothman relatively soon as well.  Regarding vaccines, I did recommend she consider the shingles vaccine and other vaccines as above.  She graciously declines those today.    In addition, we have reviewed her other care.  Paris is due for recheck on type 2 diabetes.  She is going to be seeing oncology as noted soon, and we will give her a slip for labs to be completed at that office.  Otherwise, we will tentatively plan to see her back in about a year.  I have updated refills on her medications as noted.    Diagnoses and all orders for this visit:    1. Physical exam (Primary)    2. Type 2 diabetes mellitus without complication, without long-term current use of insulin  -     Hemoglobin A1c; Future  -     Lipid Panel; Future  -     MicroAlbumin, Urine, Random - Urine, Clean Catch; Future  -     Comprehensive Metabolic Panel; Future    3. Essential hypertension  -     lisinopril (PRINIVIL,ZESTRIL) 20 MG tablet; Take 1 tablet by mouth Daily.  Dispense: 90 tablet; Refill: 3  -     metoprolol succinate XL (Toprol XL) 100 MG 24 hr tablet; Take 1 tablet by mouth Daily.  Dispense: 90 tablet; Refill: 3  -     Comprehensive Metabolic Panel; Future    4. Fatigue, unspecified  type  -     TSH+Free T4; Future  -     Vitamin B12 & Folate; Future  -     Ferritin; Future  -     Iron Profile; Future  -     CBC Auto Differential; Future    5. Anemia, unspecified type  -     Vitamin B12 & Folate; Future  -     Ferritin; Future  -     Iron Profile; Future  -     CBC Auto Differential; Future         Follow Up   Return in about 1 year (around 4/10/2024) for Recheck, Medicare Wellness.  Patient was given instructions and counseling regarding her condition or for health maintenance advice. Please see specific information pulled into the AVS if appropriate.

## 2023-04-24 NOTE — TELEPHONE ENCOUNTER
----- Message from Saleem Jimenez MD sent at 4/22/2023  6:57 AM EDT -----  Please reach out to Paris and confirm she received the message below.  Based on her heart risks, I would recommend treating the cholesterol.  Unless she has had difficulty with statin before, I would recommend adding a cholesterol-lowering medication to her regimen.  I would initially recommend using atorvastatin 20 mg nightly #90 with 3 refills.  Please give usual precautions and TICKLE for lipids, ALT, and A1c in 90 days.  Let me know if she has other concerns.  Thanks.

## 2023-04-25 RX ORDER — ATORVASTATIN CALCIUM 20 MG/1
20 TABLET, FILM COATED ORAL DAILY
Qty: 90 TABLET | Refills: 3 | Status: SHIPPED | OUTPATIENT
Start: 2023-04-25

## 2023-07-24 ENCOUNTER — TELEPHONE (OUTPATIENT)
Dept: FAMILY MEDICINE CLINIC | Age: 68
End: 2023-07-24
Payer: MEDICARE

## 2023-07-24 DIAGNOSIS — E11.9 TYPE 2 DIABETES MELLITUS WITHOUT COMPLICATION, WITHOUT LONG-TERM CURRENT USE OF INSULIN: Primary | ICD-10-CM

## 2023-07-24 DIAGNOSIS — E78.5 HYPERLIPIDEMIA, UNSPECIFIED HYPERLIPIDEMIA TYPE: ICD-10-CM

## 2023-07-24 NOTE — TELEPHONE ENCOUNTER
----- Message from Goldie Soriano LPN sent at 4/24/2023 10:20 AM EDT -----  TICKLE for lipids, ALT, and A1c in 90 days.

## 2023-07-31 ENCOUNTER — LAB (OUTPATIENT)
Dept: LAB | Facility: HOSPITAL | Age: 68
End: 2023-07-31
Payer: MEDICARE

## 2023-07-31 DIAGNOSIS — D64.9 ANEMIA, UNSPECIFIED TYPE: ICD-10-CM

## 2023-07-31 DIAGNOSIS — E78.5 HYPERLIPIDEMIA, UNSPECIFIED HYPERLIPIDEMIA TYPE: ICD-10-CM

## 2023-07-31 DIAGNOSIS — E11.9 TYPE 2 DIABETES MELLITUS WITHOUT COMPLICATION, WITHOUT LONG-TERM CURRENT USE OF INSULIN: ICD-10-CM

## 2023-07-31 DIAGNOSIS — I10 ESSENTIAL HYPERTENSION: ICD-10-CM

## 2023-07-31 DIAGNOSIS — R53.83 FATIGUE, UNSPECIFIED TYPE: ICD-10-CM

## 2023-07-31 LAB
ALBUMIN SERPL-MCNC: 4.1 G/DL (ref 3.5–5.2)
ALBUMIN UR-MCNC: 1.9 MG/DL
ALBUMIN/GLOB SERPL: 1.3 G/DL
ALP SERPL-CCNC: 134 U/L (ref 39–117)
ALT SERPL W P-5'-P-CCNC: 16 U/L (ref 1–33)
ANION GAP SERPL CALCULATED.3IONS-SCNC: 9.3 MMOL/L (ref 5–15)
AST SERPL-CCNC: 21 U/L (ref 1–32)
BASOPHILS # BLD AUTO: 0.02 10*3/MM3 (ref 0–0.2)
BASOPHILS NFR BLD AUTO: 0.4 % (ref 0–1.5)
BILIRUB SERPL-MCNC: 0.3 MG/DL (ref 0–1.2)
BUN SERPL-MCNC: 8 MG/DL (ref 8–23)
BUN/CREAT SERPL: 10 (ref 7–25)
CALCIUM SPEC-SCNC: 9.7 MG/DL (ref 8.6–10.5)
CHLORIDE SERPL-SCNC: 104 MMOL/L (ref 98–107)
CHOLEST SERPL-MCNC: 162 MG/DL (ref 0–200)
CO2 SERPL-SCNC: 25.7 MMOL/L (ref 22–29)
CREAT SERPL-MCNC: 0.8 MG/DL (ref 0.57–1)
DEPRECATED RDW RBC AUTO: 40.4 FL (ref 37–54)
EGFRCR SERPLBLD CKD-EPI 2021: 79.9 ML/MIN/1.73
EOSINOPHIL # BLD AUTO: 0.08 10*3/MM3 (ref 0–0.4)
EOSINOPHIL NFR BLD AUTO: 1.7 % (ref 0.3–6.2)
ERYTHROCYTE [DISTWIDTH] IN BLOOD BY AUTOMATED COUNT: 13.2 % (ref 12.3–15.4)
FERRITIN SERPL-MCNC: 154 NG/ML (ref 13–150)
FOLATE SERPL-MCNC: 12.2 NG/ML (ref 4.78–24.2)
GLOBULIN UR ELPH-MCNC: 3.1 GM/DL
GLUCOSE SERPL-MCNC: 154 MG/DL (ref 65–99)
HBA1C MFR BLD: 8 % (ref 4.8–5.6)
HCT VFR BLD AUTO: 38.6 % (ref 34–46.6)
HDLC SERPL-MCNC: 52 MG/DL (ref 40–60)
HGB BLD-MCNC: 12.4 G/DL (ref 12–15.9)
IMM GRANULOCYTES # BLD AUTO: 0.01 10*3/MM3 (ref 0–0.05)
IMM GRANULOCYTES NFR BLD AUTO: 0.2 % (ref 0–0.5)
IRON 24H UR-MRATE: 68 MCG/DL (ref 37–145)
IRON SATN MFR SERPL: 16 % (ref 20–50)
LDLC SERPL CALC-MCNC: 81 MG/DL (ref 0–100)
LDLC/HDLC SERPL: 1.47 {RATIO}
LYMPHOCYTES # BLD AUTO: 0.89 10*3/MM3 (ref 0.7–3.1)
LYMPHOCYTES NFR BLD AUTO: 18.9 % (ref 19.6–45.3)
MCH RBC QN AUTO: 27.1 PG (ref 26.6–33)
MCHC RBC AUTO-ENTMCNC: 32.1 G/DL (ref 31.5–35.7)
MCV RBC AUTO: 84.3 FL (ref 79–97)
MONOCYTES # BLD AUTO: 0.22 10*3/MM3 (ref 0.1–0.9)
MONOCYTES NFR BLD AUTO: 4.7 % (ref 5–12)
NEUTROPHILS NFR BLD AUTO: 3.49 10*3/MM3 (ref 1.7–7)
NEUTROPHILS NFR BLD AUTO: 74.1 % (ref 42.7–76)
PLATELET # BLD AUTO: 196 10*3/MM3 (ref 140–450)
PMV BLD AUTO: 10.2 FL (ref 6–12)
POTASSIUM SERPL-SCNC: 4.8 MMOL/L (ref 3.5–5.2)
PROT SERPL-MCNC: 7.2 G/DL (ref 6–8.5)
RBC # BLD AUTO: 4.58 10*6/MM3 (ref 3.77–5.28)
SODIUM SERPL-SCNC: 139 MMOL/L (ref 136–145)
T4 FREE SERPL-MCNC: 0.95 NG/DL (ref 0.93–1.7)
TIBC SERPL-MCNC: 423 MCG/DL (ref 298–536)
TRANSFERRIN SERPL-MCNC: 284 MG/DL (ref 200–360)
TRIGL SERPL-MCNC: 168 MG/DL (ref 0–150)
TSH SERPL DL<=0.05 MIU/L-ACNC: 1.57 UIU/ML (ref 0.27–4.2)
VIT B12 BLD-MCNC: 400 PG/ML (ref 211–946)
VLDLC SERPL-MCNC: 29 MG/DL (ref 5–40)
WBC NRBC COR # BLD: 4.71 10*3/MM3 (ref 3.4–10.8)

## 2023-07-31 PROCEDURE — 84443 ASSAY THYROID STIM HORMONE: CPT

## 2023-07-31 PROCEDURE — 84439 ASSAY OF FREE THYROXINE: CPT

## 2023-07-31 PROCEDURE — 84466 ASSAY OF TRANSFERRIN: CPT

## 2023-07-31 PROCEDURE — 85025 COMPLETE CBC W/AUTO DIFF WBC: CPT

## 2023-07-31 PROCEDURE — 82043 UR ALBUMIN QUANTITATIVE: CPT

## 2023-07-31 PROCEDURE — 83540 ASSAY OF IRON: CPT

## 2023-07-31 PROCEDURE — 80061 LIPID PANEL: CPT

## 2023-07-31 PROCEDURE — 82607 VITAMIN B-12: CPT

## 2023-07-31 PROCEDURE — 36415 COLL VENOUS BLD VENIPUNCTURE: CPT

## 2023-07-31 PROCEDURE — 83036 HEMOGLOBIN GLYCOSYLATED A1C: CPT

## 2023-07-31 PROCEDURE — 80053 COMPREHEN METABOLIC PANEL: CPT

## 2023-07-31 PROCEDURE — 82728 ASSAY OF FERRITIN: CPT

## 2023-07-31 PROCEDURE — 82746 ASSAY OF FOLIC ACID SERUM: CPT

## 2023-08-01 DIAGNOSIS — E11.9 TYPE 2 DIABETES MELLITUS WITHOUT COMPLICATION, WITHOUT LONG-TERM CURRENT USE OF INSULIN: Primary | ICD-10-CM

## 2023-08-01 RX ORDER — BLOOD-GLUCOSE METER
KIT MISCELLANEOUS
Qty: 1 EACH | Refills: 0 | Status: SHIPPED | OUTPATIENT
Start: 2023-08-01

## 2023-08-01 RX ORDER — LANCETS 30 GAUGE
EACH MISCELLANEOUS
Qty: 100 EACH | Refills: 3 | Status: SHIPPED | OUTPATIENT
Start: 2023-08-01

## 2023-08-17 ENCOUNTER — OFFICE VISIT (OUTPATIENT)
Dept: FAMILY MEDICINE CLINIC | Age: 68
End: 2023-08-17
Payer: MEDICARE

## 2023-08-17 ENCOUNTER — PATIENT MESSAGE (OUTPATIENT)
Dept: FAMILY MEDICINE CLINIC | Age: 68
End: 2023-08-17

## 2023-08-17 VITALS
SYSTOLIC BLOOD PRESSURE: 150 MMHG | BODY MASS INDEX: 33.43 KG/M2 | WEIGHT: 208 LBS | HEIGHT: 66 IN | HEART RATE: 83 BPM | TEMPERATURE: 98.8 F | DIASTOLIC BLOOD PRESSURE: 80 MMHG

## 2023-08-17 DIAGNOSIS — E11.9 TYPE 2 DIABETES MELLITUS WITHOUT COMPLICATION, WITHOUT LONG-TERM CURRENT USE OF INSULIN: Primary | ICD-10-CM

## 2023-08-17 RX ORDER — METFORMIN HYDROCHLORIDE 500 MG/1
1000 TABLET, EXTENDED RELEASE ORAL DAILY
Qty: 180 TABLET | Refills: 3 | Status: SHIPPED | OUTPATIENT
Start: 2023-08-17

## 2023-08-17 NOTE — Clinical Note
Please TICKLE to call her in 6 weeks.  How are her fasting blood sugars doing? Please TICKLE for fingerstick A1c 90 days.  Thanks.

## 2023-08-17 NOTE — PROGRESS NOTES
Maile Wright presents to [facility].    Chief Complaint:  Diabetes follow up    Subjective        History of Present Illness:  Paris is being seen today for follow up on her care she has type 2 diabetes for which she is currently on no medication.  Her most recent A1c from a few weeks ago was 8.0%.  She has been tracking her fasting sugars at home.  They have been running 166 on average over the last couple of weeks.    Review of Systems:  Review of Systems   Constitutional:  Negative for chills and fever.   Respiratory:  Negative for cough and shortness of breath.    Cardiovascular:  Negative for chest pain and palpitations.   Gastrointestinal:  Negative for abdominal pain, nausea and vomiting.      Objective   Medical History:  Past Medical History:    Endometrial carcinoma    History of cancer of right breast    Hypertension    Primary osteoarthritis of right knee    Tubular adenoma of colon     Past Surgical History:    BREAST LUMPECTOMY    COLONOSCOPY    Multiple tubular adenoma    HYSTERECTOMY    REPLACEMENT TOTAL KNEE      Family History   Problem Relation Age of Onset    Arthritis Mother     Hyperlipidemia Mother     Stroke Mother     Coronary artery disease Mother     Hypertension Mother     Hypothyroidism Mother     Lung cancer Father     Hypertension Father     Myelodysplastic syndrome Brother      Social History     Tobacco Use    Smoking status: Never    Smokeless tobacco: Never    Tobacco comments:     Never smoked   Substance Use Topics    Alcohol use: Not Currently       Health Maintenance Due   Topic Date Due    TDAP/TD VACCINES (1 - Tdap) Never done    ZOSTER VACCINE (1 of 2) Never done    DIABETIC EYE EXAM  01/24/2023          There is no immunization history on file for this patient.    No Known Allergies     Medications:  Current Outpatient Medications on File Prior to Visit   Medication Sig    atorvastatin (LIPITOR) 20 MG tablet Take 1 tablet by mouth Daily.  "   calcium carbonate-vitamin d 600-400 MG-UNIT per tablet Take 1 tablet by mouth Daily.    Cholecalciferol 50 MCG (2000 UT) capsule Take 1 capsule by mouth Daily.    glucose blood test strip Check blood sugar once daily  DX E11.9    glucose monitor monitoring kit Check blood sugar once daily  DX E11.9    Lancets misc Check blood sugar once daily  DX E11.9    lisinopril (PRINIVIL,ZESTRIL) 20 MG tablet Take 1 tablet by mouth Daily.    metoprolol succinate XL (Toprol XL) 100 MG 24 hr tablet Take 1 tablet by mouth Daily.     No current facility-administered medications on file prior to visit.       Vital Signs:   Vitals:    08/17/23 1310 08/17/23 1343   BP: 149/87 150/80   BP Location: Left arm Left arm   Patient Position: Sitting Sitting   Pulse: 80 83   Temp: 98.8 øF (37.1 øC)    TempSrc: Oral    Weight: 94.3 kg (208 lb)    Height: 166.4 cm (65.51\")    Body mass index is 34.07 kg/mý.    Physical Exam:  Physical Exam  Vitals reviewed.   Constitutional:       General: She is not in acute distress.     Appearance: She is obese. She is not ill-appearing.   Eyes:      Pupils: Pupils are equal, round, and reactive to light.   Neck:      Comments: No thyromegaly  Cardiovascular:      Rate and Rhythm: Normal rate and regular rhythm.      Pulses:           Dorsalis pedis pulses are 2+ on the right side and 2+ on the left side.   Pulmonary:      Effort: Pulmonary effort is normal.      Breath sounds: Normal breath sounds.   Abdominal:      General: There is no distension.      Palpations: Abdomen is soft.      Tenderness: There is no abdominal tenderness.   Musculoskeletal:      Cervical back: Neck supple.   Feet:      Right foot:      Protective Sensation: 3 sites tested.  3 sites sensed.      Skin integrity: Skin integrity normal.      Left foot:      Protective Sensation: 3 sites tested.  3 sites sensed.      Skin integrity: Skin integrity normal.   Lymphadenopathy:      Cervical: No cervical adenopathy.   Skin:     " Findings: No lesion or rash.   Neurological:      Mental Status: She is alert.       Result Review      The following data was reviewed by Saleem Jimenez MD on 08/17/2023.  Lab Results   Component Value Date    WBC 4.71 07/31/2023    HGB 12.4 07/31/2023    HCT 38.6 07/31/2023    MCV 84.3 07/31/2023     07/31/2023     Lab Results   Component Value Date    GLUCOSE 154 (H) 07/31/2023    BUN 8 07/31/2023    CREATININE 0.80 07/31/2023     07/31/2023    K 4.8 07/31/2023     07/31/2023    CO2 25.7 07/31/2023    CALCIUM 9.7 07/31/2023    PROTEINTOT 7.2 07/31/2023    ALBUMIN 4.1 07/31/2023    ALT 16 07/31/2023    AST 21 07/31/2023    ALKPHOS 134 (H) 07/31/2023    BILITOT 0.3 07/31/2023    EGFR 79.9 07/31/2023    GLOB 3.1 07/31/2023    AGRATIO 1.3 07/31/2023    BCR 10.0 07/31/2023    ANIONGAP 9.3 07/31/2023      Lab Results   Component Value Date    CHOL 162 07/31/2023    TRIG 168 (H) 07/31/2023    HDL 52 07/31/2023    LDL 81 07/31/2023     Lab Results   Component Value Date    TSH 1.570 07/31/2023     Lab Results   Component Value Date    HGBA1C 8.00 (H) 07/31/2023            Assessment and Plan:   Today, we have reviewed her care.  Paris is a point with her diabetes where I would recommend moving ahead with treatment.  We will prescribe metformin for her as noted below.  Hopefully, she will do well with this.  We will reach out to her again in about 6 weeks to see how her fasting sugars are doing.  She is not going to check those on a daily basis, but I would recommend she check a couple of times per week just to track things.  We will also make referral for diabetic education and set a recall in 90 days for an A1c.    ADDENDUM: Her blood pressure remained elevated on recheck.  We will reach out to her over MyChart in about a month for recheck.  No changes made regarding her blood pressure medications today though.       Diagnoses and all orders for this visit:    1. Type 2 diabetes mellitus  without complication, without long-term current use of insulin (Primary)  -     metFORMIN ER (GLUCOPHAGE-XR) 500 MG 24 hr tablet; Take 2 tablets by mouth Daily.  Dispense: 180 tablet; Refill: 3  -     Ambulatory Referral to Diabetic Education    Follow Up   Return in about 8 months (around 4/12/2024) for Recheck.  Patient was given instructions and counseling regarding her condition or for health maintenance advice. Please see specific information pulled into the AVS if appropriate.

## 2023-09-22 ENCOUNTER — CLINICAL SUPPORT (OUTPATIENT)
Dept: FAMILY MEDICINE CLINIC | Age: 68
End: 2023-09-22
Payer: MEDICARE

## 2023-09-22 ENCOUNTER — EDUCATION (OUTPATIENT)
Dept: DIABETES SERVICES | Facility: HOSPITAL | Age: 68
End: 2023-09-22
Payer: MEDICARE

## 2023-09-22 VITALS — DIASTOLIC BLOOD PRESSURE: 77 MMHG | HEART RATE: 77 BPM | SYSTOLIC BLOOD PRESSURE: 139 MMHG

## 2023-09-22 DIAGNOSIS — E11.8 TYPE 2 DIABETES MELLITUS WITH UNSPECIFIED COMPLICATIONS: Primary | ICD-10-CM

## 2023-09-22 PROCEDURE — G0108 DIAB MANAGE TRN  PER INDIV: HCPCS

## 2023-09-22 NOTE — PROGRESS NOTES
Initial Visit and Education Plan:  Maile Wright 68 y.o. presented to [facility] for initial self diabetes management education and training.  Patient states the reason for their visit is she just recently found out she had diabetes.  Maile Wright is referred by Saleem Jimenez,*.     Ht: 65.5 inches tall    Wt: 204 pounds stated weight    No Known Allergies     LABS:  Lab Results (most recent)        Latest Reference Range & Units Most Recent   Sodium 136 - 145 mmol/L 139  7/31/23 11:21   Potassium 3.5 - 5.2 mmol/L 4.8  7/31/23 11:21   Chloride 98 - 107 mmol/L 104  7/31/23 11:21   CO2 22.0 - 29.0 mmol/L 25.7  7/31/23 11:21   Anion Gap 5.0 - 15.0 mmol/L 9.3  7/31/23 11:21   BUN 8 - 23 mg/dL 8  7/31/23 11:21   Creatinine 0.57 - 1.00 mg/dL 0.80  7/31/23 11:21   BUN/Creatinine Ratio 7.0 - 25.0  10.0  7/31/23 11:21   eGFR >60.0 mL/min/1.73 79.9  7/31/23 11:21   Glucose 65 - 99 mg/dL 154 (H)  7/31/23 11:21   Calcium 8.6 - 10.5 mg/dL 9.7  7/31/23 11:21   Magnesium 1.8 - 2.4 mg/dL 1.7 (L) (E)  1/26/23 09:31   Alkaline Phosphatase 39 - 117 U/L 134 (H)  7/31/23 11:21   Total Protein 6.0 - 8.5 g/dL 7.2  7/31/23 11:21   Albumin 3.5 - 5.2 g/dL 4.1  7/31/23 11:21   Globulin gm/dL 3.1  7/31/23 11:21   A/G Ratio g/dL 1.3  7/31/23 11:21   AST (SGOT) 1 - 32 U/L 21  7/31/23 11:21   ALT (SGPT) 1 - 33 U/L 16  7/31/23 11:21   Total Bilirubin 0.0 - 1.2 mg/dL 0.3  7/31/23 11:21   Hemoglobin A1C 4.80 - 5.60 % 8.00 (H)  7/31/23 11:21   (H): Data is abnormally high  (L): Data is abnormally low  (E): External lab result           Labs reviewed with patient.    Past Medical History:   Diagnosis Date    Endometrial carcinoma     History of cancer of right breast     Hypertension 2000    Primary osteoarthritis of right knee     Tubular adenoma of colon         Past Surgical History:    BREAST LUMPECTOMY    COLONOSCOPY    Multiple tubular adenoma    HYSTERECTOMY    REPLACEMENT TOTAL KNEE        Social History      Tobacco Use    Smoking status: Never    Smokeless tobacco: Never    Tobacco comments:     Never smoked   Substance Use Topics    Alcohol use: Not Currently    Drug use: Never        Family History   Problem Relation Age of Onset    Arthritis Mother     Hyperlipidemia Mother     Stroke Mother     Coronary artery disease Mother     Hypertension Mother     Hypothyroidism Mother     Lung cancer Father     Hypertension Father     Myelodysplastic syndrome Brother         Education Plan as follows:      Blood Glucose Monitoring Instructions and Plan:   We reviewed blood glucose testing and ADA recommended blood glucose level for fasting, pre and post meals. Patient demonstrates understanding and importance of testing blood glucose 1-2 times a day; Patient will log BG results. Patient was given written material including blood glucose log.     Initial Nutrition Instructions and Plan:   Patient was instructed and demonstrated reading a food label. Serving size and carbohydrate amounts were emphasized.   45 carbs per meal 3 meals a day  15-20 carbs per snacks 3 snacks per day.   Patient was given written materials..   My Fitness Pal Coy explained and demonstrated to patient.     Medication Instructions and Plan:     Current Outpatient Medications:     atorvastatin (LIPITOR) 20 MG tablet, Take 1 tablet by mouth Daily., Disp: 90 tablet, Rfl: 3    calcium carbonate-vitamin d 600-400 MG-UNIT per tablet, Take 1 tablet by mouth Daily., Disp: , Rfl:     Cholecalciferol 50 MCG (2000 UT) capsule, Take 1 capsule by mouth Daily., Disp: , Rfl:     glucose blood test strip, Check blood sugar once daily  DX E11.9, Disp: 100 each, Rfl: 3    glucose monitor monitoring kit, Check blood sugar once daily  DX E11.9, Disp: 1 each, Rfl: 0    Lancets misc, Check blood sugar once daily  DX E11.9, Disp: 100 each, Rfl: 3    lisinopril (PRINIVIL,ZESTRIL) 20 MG tablet, Take 1 tablet by mouth Daily., Disp: 90 tablet, Rfl: 3    metFORMIN ER  (GLUCOPHAGE-XR) 500 MG 24 hr tablet, Take 2 tablets by mouth Daily., Disp: 180 tablet, Rfl: 3    metoprolol succinate XL (Toprol XL) 100 MG 24 hr tablet, Take 1 tablet by mouth Daily., Disp: 90 tablet, Rfl: 3   Medication list was reviewed with patient. Patient denies questions or concerns regarding current medication therapy. Patient was instructed to continue medications as prescribed.     Exercise:   Patient has been instructed to walk for 10 minutes after each meal initially and build strength and endurance to achieve 30 minutes of sustained exercise per day; recommended patient seek advice from Primary Care Provider prior to beginning any exercise program if other health concerns exist.     Problem solving and reducing risks:   I explained the importance of keeping provider appointments, taking medications as prescribed, having laboratory work performed as ordered by provider and seeking care as soon as possible when complications occur.     Patient Selected Behavioral Goal :  #1 -To read food labels      Patient Selected Ongoing Support Plan:  Family Member Support          Follow up Instructions and Plan:Patient was encouraged to contact DSME staff with questions and or concerns.  DSME staff contact information was given to patient.  Patient will be contacted when group classes resume.  Patient is scheduled to contact me after her next hemoglobin A1c. DSME staff will contact patient at 3 months and 6 months to evaluate patient's further needs regarding diabetes.      Other: Patient has a history of breast and uterine cancer.    This note will be forwarded to PCP.  SHUN HOUSTON-[facility], MLDE, [facility]    Start Time: 2: 50  End Time: 3: 30      09/22/2023

## 2023-09-22 NOTE — LETTER
September 22, 2023     Saleem Jimenez MD  [address] [place] [facility] [address]  [facility] [address]    Patient: Maile Wright   YOB: 1955   Date of Visit: 9/22/2023     Dear Saleem Jimenez MD:       Thank you for referring Maile Wright to me for evaluation. Below are the relevant portions of my assessment and plan of care.    If you have questions, please do not hesitate to call me. I look forward to following Maile along with you.         Sincerely,        Amy Suero, RNC-AWH[facility], MLDE, [facility]        CC: No Recipients     Initial Visit and Education Plan:  Maile Wright 68 y.o. presented to [facility] for initial self diabetes management education and training.  Patient states the reason for their visit is she just recently found out she had diabetes.  Maile Wright is referred by Saleem Jimenez,*.     Ht: 65.5 inches tall    Wt: 204 pounds stated weight    No Known Allergies     LABS:  Lab Results (most recent)        Latest Reference Range & Units Most Recent   Sodium 136 - 145 mmol/L 139  7/31/23 11:21   Potassium 3.5 - 5.2 mmol/L 4.8  7/31/23 11:21   Chloride 98 - 107 mmol/L 104  7/31/23 11:21   CO2 22.0 - 29.0 mmol/L 25.7  7/31/23 11:21   Anion Gap 5.0 - 15.0 mmol/L 9.3  7/31/23 11:21   BUN 8 - 23 mg/dL 8  7/31/23 11:21   Creatinine 0.57 - 1.00 mg/dL 0.80  7/31/23 11:21   BUN/Creatinine Ratio 7.0 - 25.0  10.0  7/31/23 11:21   eGFR >60.0 mL/min/1.73 79.9  7/31/23 11:21   Glucose 65 - 99 mg/dL 154 (H)  7/31/23 11:21   Calcium 8.6 - 10.5 mg/dL 9.7  7/31/23 11:21   Magnesium 1.8 - 2.4 mg/dL 1.7 (L) (E)  1/26/23 09:31   Alkaline Phosphatase 39 - 117 U/L 134 (H)  7/31/23 11:21   Total Protein 6.0 - 8.5 g/dL 7.2  7/31/23 11:21   Albumin 3.5 - 5.2 g/dL 4.1  7/31/23 11:21   Globulin gm/dL 3.1  7/31/23 11:21   A/G Ratio g/dL 1.3  7/31/23 11:21   AST (SGOT) 1 - 32 U/L 21  7/31/23 11:21   ALT (SGPT) 1 - 33 U/L 16  7/31/23 11:21   Total Bilirubin 0.0 - 1.2  mg/dL 0.3  7/31/23 11:21   Hemoglobin A1C 4.80 - 5.60 % 8.00 (H)  7/31/23 11:21   (H): Data is abnormally high  (L): Data is abnormally low  (E): External lab result           Labs reviewed with patient.    Past Medical History:   Diagnosis Date   • Endometrial carcinoma    • History of cancer of right breast    • Hypertension 2000   • Primary osteoarthritis of right knee    • Tubular adenoma of colon         Past Surgical History:   • BREAST LUMPECTOMY   • COLONOSCOPY    Multiple tubular adenoma   • HYSTERECTOMY   • REPLACEMENT TOTAL KNEE        Social History     Tobacco Use   • Smoking status: Never   • Smokeless tobacco: Never   • Tobacco comments:     Never smoked   Substance Use Topics   • Alcohol use: Not Currently   • Drug use: Never        Family History   Problem Relation Age of Onset   • Arthritis Mother    • Hyperlipidemia Mother    • Stroke Mother    • Coronary artery disease Mother    • Hypertension Mother    • Hypothyroidism Mother    • Lung cancer Father    • Hypertension Father    • Myelodysplastic syndrome Brother         Education Plan as follows:      Blood Glucose Monitoring Instructions and Plan:   We reviewed blood glucose testing and ADA recommended blood glucose level for fasting, pre and post meals. Patient demonstrates understanding and importance of testing blood glucose 1-2 times a day; Patient will log BG results. Patient was given written material including blood glucose log.     Initial Nutrition Instructions and Plan:   Patient was instructed and demonstrated reading a food label. Serving size and carbohydrate amounts were emphasized.   45 carbs per meal 3 meals a day  15-20 carbs per snacks 3 snacks per day.   Patient was given written materials..   My Fitness Pal [employer] explained and demonstrated to patient.     Medication Instructions and Plan:     Current Outpatient Medications:   •  atorvastatin (LIPITOR) 20 MG tablet, Take 1 tablet by mouth Daily., Disp: 90 tablet, Rfl: 3  •   calcium carbonate-vitamin d 600-400 MG-UNIT per tablet, Take 1 tablet by mouth Daily., Disp: , Rfl:   •  Cholecalciferol 50 MCG (2000 UT) capsule, Take 1 capsule by mouth Daily., Disp: , Rfl:   •  glucose blood test strip, Check blood sugar once daily  DX E11.9, Disp: 100 each, Rfl: 3  •  glucose monitor monitoring kit, Check blood sugar once daily  DX E11.9, Disp: 1 each, Rfl: 0  •  Lancets misc, Check blood sugar once daily  DX E11.9, Disp: 100 each, Rfl: 3  •  lisinopril (PRINIVIL,ZESTRIL) 20 MG tablet, Take 1 tablet by mouth Daily., Disp: 90 tablet, Rfl: 3  •  metFORMIN ER (GLUCOPHAGE-XR) 500 MG 24 hr tablet, Take 2 tablets by mouth Daily., Disp: 180 tablet, Rfl: 3  •  metoprolol succinate XL (Toprol XL) 100 MG 24 hr tablet, Take 1 tablet by mouth Daily., Disp: 90 tablet, Rfl: 3   Medication list was reviewed with patient. Patient denies questions or concerns regarding current medication therapy. Patient was instructed to continue medications as prescribed.     Exercise:   Patient has been instructed to walk for 10 minutes after each meal initially and build strength and endurance to achieve 30 minutes of sustained exercise per day; recommended patient seek advice from Primary Care Provider prior to beginning any exercise program if other health concerns exist.     Problem solving and reducing risks:   I explained the importance of keeping provider appointments, taking medications as prescribed, having laboratory work performed as ordered by provider and seeking care as soon as possible when complications occur.     Patient Selected Behavioral Goal :  #1 -To read food labels      Patient Selected Ongoing Support Plan:  Family Member Support          Follow up Instructions and Plan:Patient was encouraged to contact DSME staff with questions and or concerns.  DSME staff contact information was given to patient.  Patient will be contacted when group classes resume.  Patient is scheduled to contact me after her next  hemoglobin A1c. DSME staff will contact patient at 3 months and 6 months to evaluate patient's further needs regarding diabetes.      Other: Patient has a history of breast and uterine cancer.    This note will be forwarded to PCP.  SHUN HOUSTON-[facility], MLDE, [facility]    Start Time: 2: 50  End Time: 3: 30      09/22/2023

## 2023-09-23 NOTE — PROGRESS NOTES
Vitals:    09/22/23 1416   BP: 139/77   BP Location: Left arm   Patient Position: Sitting   Cuff Size: Adult   Pulse: 77     Good morning, Paris.  Your blood pressure was reasonable yesterday.  The top number was 139.  This is borderline, but I would not recommend any change in your medication yet.  I will plan to review your chart further after your upcoming visit with Dr. Rothman to see how your blood pressure is there.  Please continue your current medications for now.  Let me know if you have other concerns.    Saleem Jimenez MD  [facility]    PS - [employer] messages are not reviewed on an urgent basis.  It may be several days before we review and/or respond to your message.  If you have an urgent need, please call the office at [phone].

## 2023-09-28 ENCOUNTER — TELEPHONE (OUTPATIENT)
Dept: FAMILY MEDICINE CLINIC | Age: 68
End: 2023-09-28
Payer: MEDICARE

## 2023-09-28 NOTE — TELEPHONE ENCOUNTER
Pt states she has not been checking her sugars, but will start checking and will call us back in a couple weeks.

## 2023-09-28 NOTE — TELEPHONE ENCOUNTER
Noted.  Please TICKLE to call her again in 4 weeks in case we do not hear from her between now and then.  Thanks.

## 2023-09-28 NOTE — TELEPHONE ENCOUNTER
----- Message from Goldie Soriano LPN sent at 8/17/2023  1:45 PM EDT -----  TICKLE to call her in 6 weeks.  How are her fasting blood sugars doing?

## 2023-10-16 ENCOUNTER — PATIENT MESSAGE (OUTPATIENT)
Dept: FAMILY MEDICINE CLINIC | Age: 68
End: 2023-10-16
Payer: MEDICARE

## 2023-10-18 ENCOUNTER — HOSPITAL ENCOUNTER (OUTPATIENT)
Dept: OTHER | Facility: HOSPITAL | Age: 68
Discharge: HOME | End: 2023-10-18

## 2023-10-27 ENCOUNTER — CLINICAL SUPPORT (OUTPATIENT)
Dept: FAMILY MEDICINE CLINIC | Age: 68
End: 2023-10-27
Payer: MEDICARE

## 2023-10-27 VITALS — HEART RATE: 76 BPM | SYSTOLIC BLOOD PRESSURE: 137 MMHG | DIASTOLIC BLOOD PRESSURE: 77 MMHG

## 2023-10-27 NOTE — PROGRESS NOTES
Vitals:    10/27/23 0930   BP: 137/77   BP Location: Left arm   Patient Position: Sitting   Cuff Size: Adult   Pulse: 76     Good afternoon, Paris.  Your blood pressure today was reasonable.  I would not recommend any change in your medication at least for the near term.  Let me know if you have other concerns.    Saleem Jimenez MD  [facility]

## 2023-11-17 ENCOUNTER — TELEPHONE (OUTPATIENT)
Dept: FAMILY MEDICINE CLINIC | Age: 68
End: 2023-11-17
Payer: MEDICARE

## 2023-11-17 NOTE — TELEPHONE ENCOUNTER
----- Message from Goldie Soriano LPN sent at 8/17/2023  1:45 PM EDT -----  TICKLE for fingerstick A1c 90 days

## 2023-11-20 ENCOUNTER — CLINICAL SUPPORT (OUTPATIENT)
Dept: FAMILY MEDICINE CLINIC | Age: 68
End: 2023-11-20
Payer: MEDICARE

## 2023-11-20 DIAGNOSIS — E11.9 TYPE 2 DIABETES MELLITUS WITHOUT COMPLICATION, WITHOUT LONG-TERM CURRENT USE OF INSULIN: Primary | ICD-10-CM

## 2023-11-20 LAB
EXPIRATION DATE: ABNORMAL
HBA1C MFR BLD: 6.1 % (ref 4.5–5.7)
Lab: ABNORMAL

## 2024-04-05 DIAGNOSIS — I10 ESSENTIAL HYPERTENSION: ICD-10-CM

## 2024-04-05 RX ORDER — ATORVASTATIN CALCIUM 20 MG/1
20 TABLET, FILM COATED ORAL DAILY
Qty: 90 TABLET | Refills: 0 | Status: SHIPPED | OUTPATIENT
Start: 2024-04-05

## 2024-04-05 RX ORDER — LISINOPRIL 20 MG/1
20 TABLET ORAL DAILY
Qty: 90 TABLET | Refills: 0 | Status: SHIPPED | OUTPATIENT
Start: 2024-04-05

## 2024-04-05 RX ORDER — METOPROLOL SUCCINATE 100 MG/1
100 TABLET, EXTENDED RELEASE ORAL DAILY
Qty: 90 TABLET | Refills: 0 | Status: SHIPPED | OUTPATIENT
Start: 2024-04-05

## 2024-04-12 ENCOUNTER — OFFICE VISIT (OUTPATIENT)
Dept: FAMILY MEDICINE CLINIC | Age: 69
End: 2024-04-12
Payer: MEDICARE

## 2024-04-12 VITALS
DIASTOLIC BLOOD PRESSURE: 82 MMHG | HEIGHT: 66 IN | SYSTOLIC BLOOD PRESSURE: 167 MMHG | HEART RATE: 62 BPM | WEIGHT: 198 LBS | BODY MASS INDEX: 31.82 KG/M2 | OXYGEN SATURATION: 100 % | TEMPERATURE: 97.8 F

## 2024-04-12 DIAGNOSIS — Z00.00 PHYSICAL EXAM: Primary | ICD-10-CM

## 2024-04-12 DIAGNOSIS — Z79.899 OTHER LONG TERM (CURRENT) DRUG THERAPY: ICD-10-CM

## 2024-04-12 DIAGNOSIS — E66.09 CLASS 1 OBESITY DUE TO EXCESS CALORIES WITH SERIOUS COMORBIDITY AND BODY MASS INDEX (BMI) OF 32.0 TO 32.9 IN ADULT: ICD-10-CM

## 2024-04-12 DIAGNOSIS — E11.9 TYPE 2 DIABETES MELLITUS WITHOUT COMPLICATION, WITHOUT LONG-TERM CURRENT USE OF INSULIN: ICD-10-CM

## 2024-04-12 DIAGNOSIS — Z78.0 ASYMPTOMATIC POSTMENOPAUSAL STATE: ICD-10-CM

## 2024-04-12 DIAGNOSIS — Z12.31 ENCOUNTER FOR SCREENING MAMMOGRAM FOR MALIGNANT NEOPLASM OF BREAST: ICD-10-CM

## 2024-04-12 DIAGNOSIS — I10 ESSENTIAL HYPERTENSION: ICD-10-CM

## 2024-04-12 DIAGNOSIS — E78.2 MIXED HYPERLIPIDEMIA: ICD-10-CM

## 2024-04-12 RX ORDER — ATORVASTATIN CALCIUM 20 MG/1
20 TABLET, FILM COATED ORAL DAILY
Qty: 90 TABLET | Refills: 3 | Status: SHIPPED | OUTPATIENT
Start: 2024-04-12

## 2024-04-12 RX ORDER — LISINOPRIL 20 MG/1
20 TABLET ORAL DAILY
Qty: 90 TABLET | Refills: 3 | Status: SHIPPED | OUTPATIENT
Start: 2024-04-12

## 2024-04-12 RX ORDER — METOPROLOL SUCCINATE 100 MG/1
100 TABLET, EXTENDED RELEASE ORAL DAILY
Qty: 90 TABLET | Refills: 3 | Status: SHIPPED | OUTPATIENT
Start: 2024-04-12

## 2024-04-12 RX ORDER — METFORMIN HYDROCHLORIDE 500 MG/1
1000 TABLET, EXTENDED RELEASE ORAL DAILY
Qty: 180 TABLET | Refills: 3 | Status: SHIPPED | OUTPATIENT
Start: 2024-04-12

## 2024-04-12 NOTE — PROGRESS NOTES
The ABCs of the Annual Wellness Visit  Subsequent Medicare Wellness Visit    Subjective    Maile Wright is a 69 y.o. female who presents for a Subsequent Medicare Wellness Visit.    The following portions of the patient's history were reviewed and updated as appropriate: allergies, current medications, past family history, past medical history, past social history, past surgical history, and problem list.    Compared to one year ago, the patient feels her physical health is better.    Compared to one year ago, the patient feels her mental health is the same.    Recent Hospitalizations:  She was not admitted to the hospital during the last year.     Current Medical Providers:  Patient Care Team:  Saleem Jimenez MD as PCP - General (Family Medicine)  Matthew Rothman MD (Hematology and Oncology)  Gin Burnett MD as Consulting Physician (Obstetrics and Gynecology)  Celine Saleh MD as Consulting Physician (Gynecologic Oncology)    Outpatient Medications Prior to Visit   Medication Sig Dispense Refill    calcium carbonate-vitamin d 600-400 MG-UNIT per tablet Take 1 tablet by mouth Daily.      Cholecalciferol 50 MCG (2000 UT) capsule Take 1 capsule by mouth Daily.      glucose blood test strip Check blood sugar once daily  DX E11.9 100 each 3    glucose monitor monitoring kit Check blood sugar once daily  DX E11.9 1 each 0    Lancets misc Check blood sugar once daily  DX E11.9 100 each 3    atorvastatin (LIPITOR) 20 MG tablet Take 1 tablet by mouth Daily. 90 tablet 0    lisinopril (PRINIVIL,ZESTRIL) 20 MG tablet Take 1 tablet by mouth Daily. 90 tablet 0    metFORMIN ER (GLUCOPHAGE-XR) 500 MG 24 hr tablet Take 2 tablets by mouth Daily. 180 tablet 3    metoprolol succinate XL (TOPROL-XL) 100 MG 24 hr tablet Take 1 tablet by mouth Daily. 90 tablet 0     No facility-administered medications prior to visit.       No opioid medication identified on active medication list. I have reviewed chart for  "other potential  high risk medication/s and harmful drug interactions in the elderly.      Aspirin is not on active medication list.  Aspirin use is not indicated based on review of current medical condition/s. Risk of harm outweighs potential benefits.  .    Patient Active Problem List   Diagnosis    Essential hypertension    Morbid obesity    Type 2 diabetes mellitus without complication, without long-term current use of insulin    Mixed hyperlipidemia     Advance Care Planning  Advance Directive is not on file.  ACP discussion was held with the patient during this visit. Patient has an advance directive (not in EMR), copy requested.  Her , Bao, would make decisions if needed.     Objective    Vitals:    24 1329   BP: 172/74   BP Location: Left arm   Patient Position: Sitting   Pulse: 61   Temp: 97.8 °F (36.6 °C)   TempSrc: Oral   SpO2: 100%   Weight: 89.8 kg (198 lb)   Height: 166.4 cm (65.51\")   PainSc: 0-No pain     Estimated body mass index is 32.44 kg/m² as calculated from the following:    Height as of this encounter: 166.4 cm (65.51\").    Weight as of this encounter: 89.8 kg (198 lb).    BMI is >= 30 and <35. (Class 1 Obesity). The following options were offered after discussion;: exercise counseling/recommendations and nutrition counseling/recommendations    Does the patient have evidence of cognitive impairment? No        HEALTH RISK ASSESSMENT    Smoking Status:  Social History     Tobacco Use   Smoking Status Never   Smokeless Tobacco Never   Tobacco Comments    Never smoked     Alcohol Consumption:  Social History     Substance and Sexual Activity   Alcohol Use Not Currently     Fall Risk Screen:    STEADI Fall Risk Assessment was completed, and patient is at LOW risk for falls.Assessment completed on:2024    Depression Screenin/12/2024     1:27 PM   PHQ-2/PHQ-9 Depression Screening   Little Interest or Pleasure in Doing Things 0-->not at all   Feeling Down, Depressed or " Hopeless 0-->not at all   PHQ-9: Brief Depression Severity Measure Score 0       Health Habits and Functional and Cognitive Screenin/12/2024     1:27 PM   Functional & Cognitive Status   Do you have difficulty preparing food and eating? No   Do you have difficulty bathing yourself, getting dressed or grooming yourself? No   Do you have difficulty using the toilet? No   Do you have difficulty moving around from place to place? No   Do you have trouble with steps or getting out of a bed or a chair? No   Current Diet Well Balanced Diet   Dental Exam Not up to date   Eye Exam Not up to date   Exercise (times per week) 0 times per week   Current Exercises Include No Regular Exercise   Do you need help using the phone?  No   Are you deaf or do you have serious difficulty hearing?  No   Do you need help to go to places out of walking distance? No   Do you need help shopping? No   Do you need help preparing meals?  No   Do you need help with housework?  No   Do you need help with laundry? No   Do you need help taking your medications? No   Do you need help managing money? No   Do you ever drive or ride in a car without wearing a seat belt? No   Have you felt unusual stress, anger or loneliness in the last month? No   Who do you live with? Spouse   If you need help, do you have trouble finding someone available to you? No   Have you been bothered in the last four weeks by sexual problems? No   Do you have difficulty concentrating, remembering or making decisions? No       Age-appropriate Screening Schedule:  Refer to the list below for future screening recommendations based on patient's age, sex and/or medical conditions. Orders for these recommended tests are listed in the plan section. The patient has been provided with a written plan.    Health Maintenance   Topic Date Due    TDAP/TD VACCINES (1 - Tdap) Never done    ZOSTER VACCINE (1 of 2) Never done    RSV Vaccine - Adults (1 - 1-dose 60+ series) Never done     DIABETIC EYE EXAM  01/24/2023    HEMOGLOBIN A1C  05/20/2024    COVID-19 Vaccine (1 - 2023-24 season) 04/12/2025 (Originally 9/1/2023)    Pneumococcal Vaccine 65+ (1 of 2 - PCV) 04/12/2025 (Originally 1/28/1961)    LIPID PANEL  07/31/2024    URINE MICROALBUMIN  07/31/2024    INFLUENZA VACCINE  08/01/2024    DIABETIC FOOT EXAM  08/17/2024    DXA SCAN  10/18/2024    COLORECTAL CANCER SCREENING  12/13/2024    ANNUAL WELLNESS VISIT  04/12/2025    BMI FOLLOWUP  04/12/2025    MAMMOGRAM  10/18/2025    HEPATITIS C SCREENING  Completed          CMS Preventative Services Quick Reference  Risk Factors Identified During Encounter  Immunizations Discussed/Encouraged: Tdap, Influenza, Prevnar 20 (Pneumococcal 20-valent conjugate), Shingrix, COVID19, and RSV (Respiratory Syncytial Virus)  The above risks/problems have been discussed with the patient.  Pertinent information has been shared with the patient in the After Visit Summary.  An After Visit Summary and PPPS were made available to the patient.    Follow Up:   Next Medicare Wellness visit to be scheduled in 1 year.     Additional E&M Note during same encounter follows:  Patient has multiple medical problems which are significant and separately identifiable that require additional work above and beyond the Medicare Wellness Visit.      Chief Complaint:  Diabetes, blood pressure, cholesterol    Subjective    History of Present Illness:  In addition to the Medicare wellness exam, Paris is also here for follow-up on her usual care.  She has type 2 diabetes for which she currently takes metformin.  She has made significant changes to her diet and has lost quite a bit of weight over the last year.  Her fasting blood sugars are typically between approximately 110-135.    She also has hypertension and elevated cholesterol for which she remains on treatments as outlined.    Review of Systems:  Review of Systems   Constitutional:  Negative for chills and fever.   Respiratory:   "Negative for cough and shortness of breath.    Cardiovascular:  Negative for chest pain and palpitations.   Gastrointestinal:  Negative for abdominal pain, nausea and vomiting.      Objective   Vital Signs:  Vitals:    04/12/24 1329 04/12/24 1414   BP: 172/74 167/82   BP Location: Left arm Left arm   Patient Position: Sitting Sitting   Cuff Size:  Large Adult   Pulse: 61 62   Temp: 97.8 °F (36.6 °C)    TempSrc: Oral    SpO2: 100%    Weight: 89.8 kg (198 lb)    Height: 166.4 cm (65.51\")    PainSc: 0-No pain    Body mass index is 32.44 kg/m².    Physical Exam  Vitals and nursing note reviewed.   Constitutional:       General: She is not in acute distress.     Appearance: She is obese. She is not ill-appearing.   HENT:      Right Ear: Tympanic membrane and ear canal normal.      Left Ear: Tympanic membrane and ear canal normal.      Ears:      Comments: Hearing is normal with forced whisper bilaterally.     Mouth/Throat:      Mouth: Mucous membranes are moist.      Comments: Pharynx appears normal  Eyes:      Extraocular Movements: Extraocular movements intact.      Pupils: Pupils are equal, round, and reactive to light.      Comments: Binocular vision is 20/25 with correction.   Neck:      Thyroid: No thyromegaly.   Cardiovascular:      Rate and Rhythm: Normal rate and regular rhythm.      Heart sounds: No murmur heard.  Pulmonary:      Effort: Pulmonary effort is normal.      Breath sounds: Normal breath sounds.   Abdominal:      General: There is no distension.      Palpations: Abdomen is soft. There is no mass.      Tenderness: There is no abdominal tenderness.   Musculoskeletal:      Cervical back: Normal range of motion.   Skin:     Findings: No lesion or rash.   Neurological:      General: No focal deficit present.      Mental Status: She is oriented to person, place, and time.      Cranial Nerves: No cranial nerve deficit.   Psychiatric:         Mood and Affect: Mood normal.       The following data was " reviewed by Saleem Jimenez MD on 04/12/2024.  Lab Results   Component Value Date    WBC 4.71 07/31/2023    HGB 12.4 07/31/2023    HCT 38.6 07/31/2023    MCV 84.3 07/31/2023     07/31/2023     Lab Results   Component Value Date    GLUCOSE 154 (H) 07/31/2023    BUN 8 07/31/2023    CREATININE 0.80 07/31/2023     07/31/2023    K 4.8 07/31/2023     07/31/2023    CO2 25.7 07/31/2023    CALCIUM 9.7 07/31/2023    PROTEINTOT 7.2 07/31/2023    ALBUMIN 4.1 07/31/2023    ALT 16 07/31/2023    AST 21 07/31/2023    ALKPHOS 134 (H) 07/31/2023    BILITOT 0.3 07/31/2023    EGFR 79.9 07/31/2023    GLOB 3.1 07/31/2023    AGRATIO 1.3 07/31/2023    BCR 10.0 07/31/2023    ANIONGAP 9.3 07/31/2023      Lab Results   Component Value Date    CHOL 162 07/31/2023    TRIG 168 (H) 07/31/2023    HDL 52 07/31/2023    LDL 81 07/31/2023     Lab Results   Component Value Date    TSH 1.570 07/31/2023     Lab Results   Component Value Date    HGBA1C 6.1 (A) 11/20/2023             Assessment and Plan:   Today, we have reviewed her care.  Paris seems to be doing well overall.  Regarding the Medicare wellness exam, we will move ahead with orders for mammogram and DEXA scan when due.  We will plan to reach out to her later this year to arrange follow-up colonoscopy.  She declines vaccines.    Regarding her usual care, her blood pressure is above goal, and we will recheck it before she leaves.  We may need to consider additional treatment for it, but I do not want to be too quick to make changes based on a single reading.  Otherwise, we will refill her medications and update labs as noted below.  Tentative follow-up with me will be again in about 6 months.    Diagnoses and all orders for this visit:    1. Physical exam (Primary)    2. Type 2 diabetes mellitus without complication, without long-term current use of insulin  -     metFORMIN ER (GLUCOPHAGE-XR) 500 MG 24 hr tablet; Take 2 tablets by mouth Daily.  Dispense: 180 tablet;  Refill: 3  -     MicroAlbumin, Urine, Random - Urine, Clean Catch; Future  -     Hemoglobin A1c; Future  -     Comprehensive Metabolic Panel; Future    3. Essential hypertension  -     lisinopril (PRINIVIL,ZESTRIL) 20 MG tablet; Take 1 tablet by mouth Daily.  Dispense: 90 tablet; Refill: 3  -     atorvastatin (LIPITOR) 20 MG tablet; Take 1 tablet by mouth Daily.  Dispense: 90 tablet; Refill: 3  -     metoprolol succinate XL (TOPROL-XL) 100 MG 24 hr tablet; Take 1 tablet by mouth Daily.  Dispense: 90 tablet; Refill: 3  -     Comprehensive Metabolic Panel; Future    4. Mixed hyperlipidemia  -     Lipid Panel; Future  -     Comprehensive Metabolic Panel; Future  -     TSH; Future    5. Class 1 obesity due to excess calories with serious comorbidity and body mass index (BMI) of 32.0 to 32.9 in adult  -     Hemoglobin A1c; Future  -     TSH; Future    6. Other long term (current) drug therapy  -     CBC (No Diff); Future    7. Asymptomatic postmenopausal state  -     DEXA Bone Density Axial; Future    8. Encounter for screening mammogram for malignant neoplasm of breast  -     Mammo Screening Digital Tomosynthesis Bilateral With CAD; Future       Follow Up  Return in about 6 months (around 10/12/2024) for Recheck.  Patient was given instructions and counseling regarding her condition or for health maintenance advice. Please see specific information pulled into the AVS if appropriate.

## 2024-04-17 ENCOUNTER — LAB (OUTPATIENT)
Dept: LAB | Facility: HOSPITAL | Age: 69
End: 2024-04-17
Payer: MEDICARE

## 2024-04-17 DIAGNOSIS — I10 ESSENTIAL HYPERTENSION: ICD-10-CM

## 2024-04-17 DIAGNOSIS — E78.2 MIXED HYPERLIPIDEMIA: ICD-10-CM

## 2024-04-17 DIAGNOSIS — Z79.899 OTHER LONG TERM (CURRENT) DRUG THERAPY: ICD-10-CM

## 2024-04-17 DIAGNOSIS — E66.09 CLASS 1 OBESITY DUE TO EXCESS CALORIES WITH SERIOUS COMORBIDITY AND BODY MASS INDEX (BMI) OF 32.0 TO 32.9 IN ADULT: ICD-10-CM

## 2024-04-17 DIAGNOSIS — E11.9 TYPE 2 DIABETES MELLITUS WITHOUT COMPLICATION, WITHOUT LONG-TERM CURRENT USE OF INSULIN: ICD-10-CM

## 2024-04-17 LAB
ALBUMIN SERPL-MCNC: 4.2 G/DL (ref 3.5–5.2)
ALBUMIN UR-MCNC: <1.2 MG/DL
ALBUMIN/GLOB SERPL: 1.4 G/DL
ALP SERPL-CCNC: 122 U/L (ref 39–117)
ALT SERPL W P-5'-P-CCNC: 13 U/L (ref 1–33)
ANION GAP SERPL CALCULATED.3IONS-SCNC: 9.4 MMOL/L (ref 5–15)
AST SERPL-CCNC: 18 U/L (ref 1–32)
BILIRUB SERPL-MCNC: 0.3 MG/DL (ref 0–1.2)
BUN SERPL-MCNC: 15 MG/DL (ref 8–23)
BUN/CREAT SERPL: 18.5 (ref 7–25)
CALCIUM SPEC-SCNC: 9.2 MG/DL (ref 8.6–10.5)
CHLORIDE SERPL-SCNC: 106 MMOL/L (ref 98–107)
CHOLEST SERPL-MCNC: 144 MG/DL (ref 0–200)
CO2 SERPL-SCNC: 27.6 MMOL/L (ref 22–29)
CREAT SERPL-MCNC: 0.81 MG/DL (ref 0.57–1)
DEPRECATED RDW RBC AUTO: 43.3 FL (ref 37–54)
EGFRCR SERPLBLD CKD-EPI 2021: 78.2 ML/MIN/1.73
ERYTHROCYTE [DISTWIDTH] IN BLOOD BY AUTOMATED COUNT: 13.6 % (ref 12.3–15.4)
GLOBULIN UR ELPH-MCNC: 2.9 GM/DL
GLUCOSE SERPL-MCNC: 122 MG/DL (ref 65–99)
HBA1C MFR BLD: 6.5 % (ref 4.8–5.6)
HCT VFR BLD AUTO: 39.2 % (ref 34–46.6)
HDLC SERPL-MCNC: 56 MG/DL (ref 40–60)
HGB BLD-MCNC: 12.3 G/DL (ref 12–15.9)
LDLC SERPL CALC-MCNC: 69 MG/DL (ref 0–100)
LDLC/HDLC SERPL: 1.2 {RATIO}
MCH RBC QN AUTO: 27.2 PG (ref 26.6–33)
MCHC RBC AUTO-ENTMCNC: 31.4 G/DL (ref 31.5–35.7)
MCV RBC AUTO: 86.7 FL (ref 79–97)
PLATELET # BLD AUTO: 238 10*3/MM3 (ref 140–450)
PMV BLD AUTO: 10.6 FL (ref 6–12)
POTASSIUM SERPL-SCNC: 5.1 MMOL/L (ref 3.5–5.2)
PROT SERPL-MCNC: 7.1 G/DL (ref 6–8.5)
RBC # BLD AUTO: 4.52 10*6/MM3 (ref 3.77–5.28)
SODIUM SERPL-SCNC: 143 MMOL/L (ref 136–145)
TRIGL SERPL-MCNC: 103 MG/DL (ref 0–150)
TSH SERPL DL<=0.05 MIU/L-ACNC: 2.44 UIU/ML (ref 0.27–4.2)
VLDLC SERPL-MCNC: 19 MG/DL (ref 5–40)
WBC NRBC COR # BLD AUTO: 5.42 10*3/MM3 (ref 3.4–10.8)

## 2024-04-17 PROCEDURE — 83036 HEMOGLOBIN GLYCOSYLATED A1C: CPT

## 2024-04-17 PROCEDURE — 84443 ASSAY THYROID STIM HORMONE: CPT

## 2024-04-17 PROCEDURE — 85027 COMPLETE CBC AUTOMATED: CPT

## 2024-04-17 PROCEDURE — 80053 COMPREHEN METABOLIC PANEL: CPT

## 2024-04-17 PROCEDURE — 80061 LIPID PANEL: CPT

## 2024-04-17 PROCEDURE — 36415 COLL VENOUS BLD VENIPUNCTURE: CPT

## 2024-04-17 PROCEDURE — 82043 UR ALBUMIN QUANTITATIVE: CPT

## 2024-05-16 ENCOUNTER — TELEPHONE (OUTPATIENT)
Dept: FAMILY MEDICINE CLINIC | Age: 69
End: 2024-05-16
Payer: MEDICARE

## 2024-05-17 ENCOUNTER — CLINICAL SUPPORT (OUTPATIENT)
Dept: FAMILY MEDICINE CLINIC | Age: 69
End: 2024-05-17
Payer: MEDICARE

## 2024-05-17 VITALS — HEART RATE: 88 BPM | DIASTOLIC BLOOD PRESSURE: 83 MMHG | SYSTOLIC BLOOD PRESSURE: 125 MMHG

## 2024-05-17 PROCEDURE — 99212 OFFICE O/P EST SF 10 MIN: CPT | Performed by: FAMILY MEDICINE

## 2024-05-17 NOTE — PROGRESS NOTES
Vitals:    05/17/24 1337 05/17/24 1344   BP: 151/85  Comment: with pt's B/P machine. 125/83  Comment: with office B/P machine.   BP Location: Left arm Left arm   Patient Position: Sitting Sitting   Cuff Size: Adult Adult   Pulse: 85 88     Paris, your blood pressure was in a good range here on our machine.  It looks like it was higher when it was checked with your home machine.  It is unclear whether that is because your home machine is inaccurate or if the blood pressure may have dropped between readings.  Either way, I would not recommend a change in your care based on today's numbers.  Please continue your current medications, and plan to see me again in October as scheduled, sooner if needed.  Let me know if you have other concerns.    Saleem Jimenez MD  [facility]    PS - MyChart messages are not reviewed on an urgent basis.  It may be several days before we review and/or respond to your message.  If you have an urgent need, please call the office at [phone].

## 2024-10-01 ENCOUNTER — TELEPHONE (OUTPATIENT)
Dept: FAMILY MEDICINE CLINIC | Age: 69
End: 2024-10-01
Payer: MEDICARE

## 2024-10-01 DIAGNOSIS — Z12.11 COLON CANCER SCREENING: Primary | ICD-10-CM

## 2024-10-01 DIAGNOSIS — D12.6 TUBULAR ADENOMA OF COLON: ICD-10-CM

## 2024-10-01 NOTE — TELEPHONE ENCOUNTER
----- Message from Saleem Jimenez sent at 4/12/2024  1:45 PM EDT -----  Please move ahead with referral back to Dr. Jimenez for colonoscopy for colon cancer screening and tubular adenoma of colon.  If Dr. Jimenez is not available, then arrange referral to Dr. Luis at [facility].  Thanks.

## 2024-10-18 ENCOUNTER — OFFICE VISIT (OUTPATIENT)
Dept: FAMILY MEDICINE CLINIC | Age: 69
End: 2024-10-18
Payer: MEDICARE

## 2024-10-18 ENCOUNTER — LAB (OUTPATIENT)
Dept: LAB | Facility: HOSPITAL | Age: 69
End: 2024-10-18
Payer: MEDICARE

## 2024-10-18 VITALS
OXYGEN SATURATION: 99 % | TEMPERATURE: 98.6 F | HEIGHT: 66 IN | WEIGHT: 209.2 LBS | DIASTOLIC BLOOD PRESSURE: 80 MMHG | BODY MASS INDEX: 33.62 KG/M2 | HEART RATE: 75 BPM | SYSTOLIC BLOOD PRESSURE: 144 MMHG

## 2024-10-18 DIAGNOSIS — I10 ESSENTIAL HYPERTENSION: ICD-10-CM

## 2024-10-18 DIAGNOSIS — E78.2 MIXED HYPERLIPIDEMIA: ICD-10-CM

## 2024-10-18 DIAGNOSIS — E66.01 MORBID OBESITY: ICD-10-CM

## 2024-10-18 DIAGNOSIS — E11.9 TYPE 2 DIABETES MELLITUS WITHOUT COMPLICATION, WITHOUT LONG-TERM CURRENT USE OF INSULIN: ICD-10-CM

## 2024-10-18 DIAGNOSIS — E11.9 TYPE 2 DIABETES MELLITUS WITHOUT COMPLICATION, WITHOUT LONG-TERM CURRENT USE OF INSULIN: Primary | ICD-10-CM

## 2024-10-18 LAB
ALBUMIN SERPL-MCNC: 4.3 G/DL (ref 3.5–5.2)
ALBUMIN/GLOB SERPL: 1.3 G/DL
ALP SERPL-CCNC: 114 U/L (ref 39–117)
ALT SERPL W P-5'-P-CCNC: 19 U/L (ref 1–33)
ANION GAP SERPL CALCULATED.3IONS-SCNC: 9.5 MMOL/L (ref 5–15)
AST SERPL-CCNC: 21 U/L (ref 1–32)
BILIRUB SERPL-MCNC: 0.3 MG/DL (ref 0–1.2)
BUN SERPL-MCNC: 13 MG/DL (ref 8–23)
BUN/CREAT SERPL: 16.9 (ref 7–25)
CALCIUM SPEC-SCNC: 10.5 MG/DL (ref 8.6–10.5)
CHLORIDE SERPL-SCNC: 102 MMOL/L (ref 98–107)
CO2 SERPL-SCNC: 27.5 MMOL/L (ref 22–29)
CREAT SERPL-MCNC: 0.77 MG/DL (ref 0.57–1)
EGFRCR SERPLBLD CKD-EPI 2021: 83.1 ML/MIN/1.73
GLOBULIN UR ELPH-MCNC: 3.3 GM/DL
GLUCOSE SERPL-MCNC: 82 MG/DL (ref 65–99)
HBA1C MFR BLD: 7.2 % (ref 4.8–5.6)
POTASSIUM SERPL-SCNC: 4.5 MMOL/L (ref 3.5–5.2)
PROT SERPL-MCNC: 7.6 G/DL (ref 6–8.5)
SODIUM SERPL-SCNC: 139 MMOL/L (ref 136–145)

## 2024-10-18 PROCEDURE — 36415 COLL VENOUS BLD VENIPUNCTURE: CPT

## 2024-10-18 PROCEDURE — 83036 HEMOGLOBIN GLYCOSYLATED A1C: CPT

## 2024-10-18 PROCEDURE — 3078F DIAST BP <80 MM HG: CPT | Performed by: FAMILY MEDICINE

## 2024-10-18 PROCEDURE — 1126F AMNT PAIN NOTED NONE PRSNT: CPT | Performed by: FAMILY MEDICINE

## 2024-10-18 PROCEDURE — 80053 COMPREHEN METABOLIC PANEL: CPT

## 2024-10-18 PROCEDURE — 3044F HG A1C LEVEL LT 7.0%: CPT | Performed by: FAMILY MEDICINE

## 2024-10-18 PROCEDURE — 99214 OFFICE O/P EST MOD 30 MIN: CPT | Performed by: FAMILY MEDICINE

## 2024-10-18 PROCEDURE — 1160F RVW MEDS BY RX/DR IN RCRD: CPT | Performed by: FAMILY MEDICINE

## 2024-10-18 PROCEDURE — 1159F MED LIST DOCD IN RCRD: CPT | Performed by: FAMILY MEDICINE

## 2024-10-18 PROCEDURE — 3077F SYST BP >= 140 MM HG: CPT | Performed by: FAMILY MEDICINE

## 2024-10-18 NOTE — PROGRESS NOTES
Maile Wright presents to [facility].    Chief Complaint:  Diabetes, blood pressure, cholesterol    Subjective   History of Present Illness:  Paris is being seen today for follow-up on her care.  She has type 2 diabetes for which she currently takes metformin.  She says her blood sugar has been running around 135 when she checks it at home.  She denies any obvious hypoglycemia.  Her most recent A1c was 6.5%.  Her weight is up approximately 11 pounds since she was seen here most recently.  She has not previously been on GLP-1 agonist therapy.  He has no family history of multiple endocrine neoplasia or medullary thyroid carcinoma.  She denies any personal history of pancreatitis.    She also has hypertension and elevated cholesterol for which she remains on treatments as noted.  Her blood pressure is mildly elevated on initial check.  She does not check her blood pressure at home routinely.    Review of Systems:  Review of Systems   Constitutional:  Negative for chills and fever.   Respiratory:  Negative for cough and shortness of breath.    Cardiovascular:  Negative for chest pain and palpitations.   Gastrointestinal:  Negative for abdominal pain, nausea and vomiting.      Objective   Medical History:  Past Medical History:    Endometrial carcinoma    History of cancer of right breast    Hypertension    Primary osteoarthritis of right knee    Tubular adenoma of colon    Type 2 diabetes mellitus     Past Surgical History:    BREAST LUMPECTOMY    COLONOSCOPY    Multiple tubular adenoma    HYSTERECTOMY    REPLACEMENT TOTAL KNEE      Family History   Problem Relation Age of Onset    Arthritis Mother     Hyperlipidemia Mother     Stroke Mother     Coronary artery disease Mother     Hypertension Mother     Hypothyroidism Mother     Lung cancer Father     Hypertension Father     Cancer Father     Myelodysplastic syndrome Brother      Social History     Tobacco Use    Smoking status: Never  "   Smokeless tobacco: Never    Tobacco comments:     Never smoked   Substance Use Topics    Alcohol use: Not Currently       Health Maintenance Due   Topic Date Due    DIABETIC EYE EXAM  01/24/2023    HEMOGLOBIN A1C  10/17/2024    DXA SCAN  10/18/2024    COLORECTAL CANCER SCREENING  12/13/2024          There is no immunization history on file for this patient.    No Known Allergies     Medications:    Current Outpatient Medications:     atorvastatin (LIPITOR) 20 MG tablet, Take 1 tablet by mouth Daily., Disp: 90 tablet, Rfl: 3    calcium carbonate-vitamin d 600-400 MG-UNIT per tablet, Take 1 tablet by mouth Daily., Disp: , Rfl:     Cholecalciferol 50 MCG (2000 UT) capsule, Take 1 capsule by mouth Daily., Disp: , Rfl:     glucose blood test strip, Check blood sugar once daily  DX E11.9, Disp: 100 each, Rfl: 3    glucose monitor monitoring kit, Check blood sugar once daily  DX E11.9, Disp: 1 each, Rfl: 0    Lancets misc, Check blood sugar once daily  DX E11.9, Disp: 100 each, Rfl: 3    lisinopril (PRINIVIL,ZESTRIL) 20 MG tablet, Take 1 tablet by mouth Daily., Disp: 90 tablet, Rfl: 3    metFORMIN ER (GLUCOPHAGE-XR) 500 MG 24 hr tablet, Take 2 tablets by mouth Daily., Disp: 180 tablet, Rfl: 3    metoprolol succinate XL (TOPROL-XL) 100 MG 24 hr tablet, Take 1 tablet by mouth Daily., Disp: 90 tablet, Rfl: 3    Vital Signs:   Vitals:    10/18/24 1351 10/18/24 1415   BP: 148/79 144/80   BP Location: Left arm Left arm   Patient Position: Sitting Sitting   Pulse: 70 75   Temp: 98.6 °F (37 °C)    TempSrc: Oral    SpO2: 99%    Weight: 94.9 kg (209 lb 3.2 oz)    Height: 166.4 cm (65.51\")    Body mass index is 34.27 kg/m².    Physical Exam:  Physical Exam  Vitals reviewed.   Constitutional:       General: She is not in acute distress.     Appearance: She is not ill-appearing.   Eyes:      Pupils: Pupils are equal, round, and reactive to light.   Neck:      Comments: No thyromegaly  Cardiovascular:      Rate and Rhythm: Normal " rate and regular rhythm.      Pulses:           Dorsalis pedis pulses are 2+ on the right side and 2+ on the left side.   Pulmonary:      Effort: Pulmonary effort is normal.      Breath sounds: Normal breath sounds.   Abdominal:      General: There is no distension.      Palpations: Abdomen is soft.      Tenderness: There is no abdominal tenderness.   Musculoskeletal:      Cervical back: Neck supple.   Feet:      Right foot:      Protective Sensation: 3 sites tested.  3 sites sensed.      Skin integrity: Skin integrity normal. Callus present.      Left foot:      Protective Sensation: 3 sites tested.  3 sites sensed.      Skin integrity: Skin integrity normal. Callus present.   Lymphadenopathy:      Cervical: No cervical adenopathy.   Skin:     Findings: No lesion or rash.   Neurological:      Mental Status: She is alert.     Result Review   The following data was reviewed by Saleem Jimenez MD on 10/18/2024.  Lab Results   Component Value Date    WBC 5.42 04/17/2024    HGB 12.3 04/17/2024    HCT 39.2 04/17/2024    MCV 86.7 04/17/2024     04/17/2024     Lab Results   Component Value Date    GLUCOSE 122 (H) 04/17/2024    BUN 15 04/17/2024    CREATININE 0.81 04/17/2024     04/17/2024    K 5.1 04/17/2024     04/17/2024    CO2 27.6 04/17/2024    CALCIUM 9.2 04/17/2024    PROTEINTOT 7.1 04/17/2024    ALBUMIN 4.2 04/17/2024    ALT 13 04/17/2024    AST 18 04/17/2024    ALKPHOS 122 (H) 04/17/2024    BILITOT 0.3 04/17/2024    EGFR 78.2 04/17/2024    GLOB 2.9 04/17/2024    AGRATIO 1.4 04/17/2024    BCR 18.5 04/17/2024    ANIONGAP 9.4 04/17/2024      Lab Results   Component Value Date    CHOL 144 04/17/2024    TRIG 103 04/17/2024    HDL 56 04/17/2024    LDL 69 04/17/2024     Lab Results   Component Value Date    TSH 2.440 04/17/2024     Lab Results   Component Value Date    HGBA1C 6.50 (H) 04/17/2024     BMI is >= 30 and <35. (Class 1 Obesity). The following options were offered after discussion;:  exercise counseling/recommendations and nutrition counseling/recommendations         Assessment and Plan:   Today, we have reviewed her care.  Overall, Paris seems well.  Her blood pressure is mildly elevated on initial check, and we will recheck it before she leaves.  Otherwise, we will continue her current medications and update labs as noted below.  We had some discussion regarding GLP-1 agonist therapy, but she would prefer to avoid additional medications if possible.  Tentative follow-up with me will be again in about 6 months, sooner if needed.    Diagnoses and all orders for this visit:    1. Type 2 diabetes mellitus without complication, without long-term current use of insulin (Primary)  -     Comprehensive Metabolic Panel; Future  -     Hemoglobin A1c; Future    2. Essential hypertension  -     Comprehensive Metabolic Panel; Future    3. Mixed hyperlipidemia  -     Comprehensive Metabolic Panel; Future    4. Morbid obesity  -     Hemoglobin A1c; Future    Follow Up  Return in about 6 months (around 4/18/2025) for Recheck, Medicare Wellness.  Patient was given instructions and counseling regarding her condition or for health maintenance advice. Please see specific information pulled into the AVS if appropriate.

## 2024-11-04 ENCOUNTER — HOSPITAL ENCOUNTER (OUTPATIENT)
Dept: BONE DENSITY | Facility: HOSPITAL | Age: 69
Discharge: HOME | End: 2024-11-04
Payer: MEDICARE

## 2024-11-04 ENCOUNTER — HOSPITAL ENCOUNTER (OUTPATIENT)
Dept: MAMMOGRAPHY | Facility: HOSPITAL | Age: 69
Discharge: HOME | End: 2024-11-04
Payer: MEDICARE

## 2024-11-04 DIAGNOSIS — Z78.0 ASYMPTOMATIC POSTMENOPAUSAL STATE: ICD-10-CM

## 2024-11-04 DIAGNOSIS — Z12.31 ENCOUNTER FOR SCREENING MAMMOGRAM FOR MALIGNANT NEOPLASM OF BREAST: ICD-10-CM

## 2024-11-04 PROCEDURE — 77067 SCR MAMMO BI INCL CAD: CPT

## 2024-11-04 PROCEDURE — 77063 BREAST TOMOSYNTHESIS BI: CPT

## 2024-11-04 PROCEDURE — 77080 DXA BONE DENSITY AXIAL: CPT

## 2024-11-08 ENCOUNTER — TELEPHONE (OUTPATIENT)
Dept: FAMILY MEDICINE CLINIC | Age: 69
End: 2024-11-08
Payer: MEDICARE

## 2024-11-19 ENCOUNTER — TELEPHONE (OUTPATIENT)
Dept: FAMILY MEDICINE CLINIC | Age: 69
End: 2024-11-19
Payer: MEDICARE

## 2024-11-19 NOTE — TELEPHONE ENCOUNTER
----- Message from Daniela BRYANT sent at 10/21/2024 10:22 AM EDT -----  TICKLE for blood pressure check in 4 weeks as well.

## 2024-11-22 ENCOUNTER — CLINICAL SUPPORT (OUTPATIENT)
Dept: FAMILY MEDICINE CLINIC | Age: 69
End: 2024-11-22
Payer: MEDICARE

## 2024-11-22 VITALS — HEART RATE: 64 BPM | DIASTOLIC BLOOD PRESSURE: 78 MMHG | SYSTOLIC BLOOD PRESSURE: 147 MMHG

## 2025-01-20 ENCOUNTER — TELEPHONE (OUTPATIENT)
Dept: FAMILY MEDICINE CLINIC | Age: 70
End: 2025-01-20
Payer: MEDICARE

## 2025-01-20 NOTE — TELEPHONE ENCOUNTER
----- Message from Daniela BRYANT sent at 10/21/2024 10:22 AM EDT -----  TICKLE for fingerstick A1c in 90 days.

## 2025-01-23 ENCOUNTER — CLINICAL SUPPORT (OUTPATIENT)
Dept: FAMILY MEDICINE CLINIC | Age: 70
End: 2025-01-23
Payer: MEDICARE

## 2025-01-23 DIAGNOSIS — E11.9 TYPE 2 DIABETES MELLITUS WITHOUT COMPLICATION, WITHOUT LONG-TERM CURRENT USE OF INSULIN: Primary | ICD-10-CM

## 2025-01-23 LAB
EXPIRATION DATE: ABNORMAL
HBA1C MFR BLD: 7.5 % (ref 4.5–5.7)
Lab: ABNORMAL

## 2025-01-23 PROCEDURE — 83036 HEMOGLOBIN GLYCOSYLATED A1C: CPT | Performed by: FAMILY MEDICINE

## 2025-01-23 PROCEDURE — 3051F HG A1C>EQUAL 7.0%<8.0%: CPT | Performed by: FAMILY MEDICINE

## 2025-01-24 RX ORDER — ORAL SEMAGLUTIDE 3 MG/1
3 TABLET ORAL DAILY
Qty: 30 TABLET | Refills: 0 | Status: SHIPPED | OUTPATIENT
Start: 2025-01-24

## 2025-02-14 ENCOUNTER — TELEPHONE (OUTPATIENT)
Dept: FAMILY MEDICINE CLINIC | Age: 70
End: 2025-02-14
Payer: MEDICARE

## 2025-02-14 DIAGNOSIS — E11.9 TYPE 2 DIABETES MELLITUS WITHOUT COMPLICATION, WITHOUT LONG-TERM CURRENT USE OF INSULIN: Primary | ICD-10-CM

## 2025-02-14 NOTE — TELEPHONE ENCOUNTER
Pt states she is tolerating the med ok, but it is expensive. She would like to know if there is something cheaper you can prescribe. Please advise.

## 2025-02-14 NOTE — TELEPHONE ENCOUNTER
----- Message from Goldie MILLS sent at 1/24/2025  1:34 PM EST -----   TICKLE to call her in 3 weeks to see if we may send the 7 mg dose to mail order.  Thanks.

## 2025-02-15 NOTE — TELEPHONE ENCOUNTER
We could move forward with one of the injectable GLP-1 agonists, Mounjaro, Ozempic, or Trulicity.  I would recommend she review her formulary and see if 1 of these would be more affordable for her than Rybelsus.  Thanks.

## 2025-02-17 RX ORDER — ORAL SEMAGLUTIDE 7 MG/1
7 TABLET ORAL DAILY
Qty: 90 TABLET | Refills: 3 | Status: SHIPPED | OUTPATIENT
Start: 2025-02-17

## 2025-02-26 DIAGNOSIS — E11.9 TYPE 2 DIABETES MELLITUS WITHOUT COMPLICATION, WITHOUT LONG-TERM CURRENT USE OF INSULIN: ICD-10-CM

## 2025-02-26 RX ORDER — BLOOD SUGAR DIAGNOSTIC
1 STRIP MISCELLANEOUS DAILY
Qty: 100 EACH | Refills: 3 | Status: SHIPPED | OUTPATIENT
Start: 2025-02-26

## 2025-02-26 RX ORDER — LANCETS 33 GAUGE
1 EACH MISCELLANEOUS DAILY
Qty: 100 EACH | Refills: 3 | Status: SHIPPED | OUTPATIENT
Start: 2025-02-26

## 2025-05-12 ENCOUNTER — LAB (OUTPATIENT)
Dept: LAB | Facility: HOSPITAL | Age: 70
End: 2025-05-12
Payer: MEDICARE

## 2025-05-12 ENCOUNTER — OFFICE VISIT (OUTPATIENT)
Dept: FAMILY MEDICINE CLINIC | Age: 70
End: 2025-05-12
Payer: MEDICARE

## 2025-05-12 VITALS
OXYGEN SATURATION: 95 % | HEART RATE: 96 BPM | WEIGHT: 201.4 LBS | BODY MASS INDEX: 32.37 KG/M2 | SYSTOLIC BLOOD PRESSURE: 117 MMHG | TEMPERATURE: 98.2 F | DIASTOLIC BLOOD PRESSURE: 72 MMHG | HEIGHT: 66 IN

## 2025-05-12 DIAGNOSIS — E78.2 MIXED HYPERLIPIDEMIA: ICD-10-CM

## 2025-05-12 DIAGNOSIS — I10 ESSENTIAL HYPERTENSION: ICD-10-CM

## 2025-05-12 DIAGNOSIS — Z00.00 PHYSICAL EXAM: Primary | ICD-10-CM

## 2025-05-12 DIAGNOSIS — Z79.899 OTHER LONG TERM (CURRENT) DRUG THERAPY: ICD-10-CM

## 2025-05-12 DIAGNOSIS — E11.9 TYPE 2 DIABETES MELLITUS WITHOUT COMPLICATION, WITHOUT LONG-TERM CURRENT USE OF INSULIN: ICD-10-CM

## 2025-05-12 DIAGNOSIS — E66.01 MORBID OBESITY: ICD-10-CM

## 2025-05-12 LAB
ALBUMIN SERPL-MCNC: 4.1 G/DL (ref 3.5–5.2)
ALBUMIN/GLOB SERPL: 1 G/DL
ALP SERPL-CCNC: 104 U/L (ref 39–117)
ALT SERPL W P-5'-P-CCNC: 17 U/L (ref 1–33)
ANION GAP SERPL CALCULATED.3IONS-SCNC: 9.2 MMOL/L (ref 5–15)
AST SERPL-CCNC: 22 U/L (ref 1–32)
BACTERIA UR QL AUTO: ABNORMAL /HPF
BILIRUB SERPL-MCNC: 0.3 MG/DL (ref 0–1.2)
BILIRUB UR QL STRIP: NEGATIVE
BUN SERPL-MCNC: 11 MG/DL (ref 8–23)
BUN/CREAT SERPL: 12.4 (ref 7–25)
CALCIUM SPEC-SCNC: 10.5 MG/DL (ref 8.6–10.5)
CHLORIDE SERPL-SCNC: 102 MMOL/L (ref 98–107)
CHOLEST SERPL-MCNC: 124 MG/DL (ref 0–200)
CLARITY UR: ABNORMAL
CO2 SERPL-SCNC: 26.8 MMOL/L (ref 22–29)
COLOR UR: YELLOW
CREAT SERPL-MCNC: 0.89 MG/DL (ref 0.57–1)
DEPRECATED RDW RBC AUTO: 43 FL (ref 37–54)
EGFRCR SERPLBLD CKD-EPI 2021: 69.4 ML/MIN/1.73
ERYTHROCYTE [DISTWIDTH] IN BLOOD BY AUTOMATED COUNT: 13.3 % (ref 12.3–15.4)
GLOBULIN UR ELPH-MCNC: 4 GM/DL
GLUCOSE SERPL-MCNC: 103 MG/DL (ref 65–99)
GLUCOSE UR STRIP-MCNC: NEGATIVE MG/DL
HBA1C MFR BLD: 6.2 % (ref 4.8–5.6)
HCT VFR BLD AUTO: 37.7 % (ref 34–46.6)
HDLC SERPL-MCNC: 43 MG/DL (ref 40–60)
HGB BLD-MCNC: 11.8 G/DL (ref 12–15.9)
HGB UR QL STRIP.AUTO: NEGATIVE
HYALINE CASTS UR QL AUTO: ABNORMAL /LPF
KETONES UR QL STRIP: ABNORMAL
LDLC SERPL CALC-MCNC: 61 MG/DL (ref 0–100)
LDLC/HDLC SERPL: 1.36 {RATIO}
LEUKOCYTE ESTERASE UR QL STRIP.AUTO: ABNORMAL
MCH RBC QN AUTO: 26.9 PG (ref 26.6–33)
MCHC RBC AUTO-ENTMCNC: 31.3 G/DL (ref 31.5–35.7)
MCV RBC AUTO: 86.1 FL (ref 79–97)
NITRITE UR QL STRIP: NEGATIVE
PH UR STRIP.AUTO: 5.5 [PH] (ref 5–8)
PLATELET # BLD AUTO: 288 10*3/MM3 (ref 140–450)
PMV BLD AUTO: 10.1 FL (ref 6–12)
POTASSIUM SERPL-SCNC: 4.5 MMOL/L (ref 3.5–5.2)
PROT SERPL-MCNC: 8.1 G/DL (ref 6–8.5)
PROT UR QL STRIP: NEGATIVE
RBC # BLD AUTO: 4.38 10*6/MM3 (ref 3.77–5.28)
RBC # UR STRIP: ABNORMAL /HPF
REF LAB TEST METHOD: ABNORMAL
SODIUM SERPL-SCNC: 138 MMOL/L (ref 136–145)
SP GR UR STRIP: >=1.03 (ref 1–1.03)
SQUAMOUS #/AREA URNS HPF: ABNORMAL /HPF
TRIGL SERPL-MCNC: 112 MG/DL (ref 0–150)
TSH SERPL DL<=0.05 MIU/L-ACNC: 1.04 UIU/ML (ref 0.27–4.2)
UROBILINOGEN UR QL STRIP: ABNORMAL
VLDLC SERPL-MCNC: 20 MG/DL (ref 5–40)
WBC # UR STRIP: ABNORMAL /HPF
WBC NRBC COR # BLD AUTO: 5.33 10*3/MM3 (ref 3.4–10.8)

## 2025-05-12 PROCEDURE — 82043 UR ALBUMIN QUANTITATIVE: CPT

## 2025-05-12 PROCEDURE — 83036 HEMOGLOBIN GLYCOSYLATED A1C: CPT

## 2025-05-12 PROCEDURE — 3078F DIAST BP <80 MM HG: CPT | Performed by: FAMILY MEDICINE

## 2025-05-12 PROCEDURE — 36415 COLL VENOUS BLD VENIPUNCTURE: CPT

## 2025-05-12 PROCEDURE — 85027 COMPLETE CBC AUTOMATED: CPT

## 2025-05-12 PROCEDURE — 99214 OFFICE O/P EST MOD 30 MIN: CPT | Performed by: FAMILY MEDICINE

## 2025-05-12 PROCEDURE — G2211 COMPLEX E/M VISIT ADD ON: HCPCS | Performed by: FAMILY MEDICINE

## 2025-05-12 PROCEDURE — 81001 URINALYSIS AUTO W/SCOPE: CPT

## 2025-05-12 PROCEDURE — 1170F FXNL STATUS ASSESSED: CPT | Performed by: FAMILY MEDICINE

## 2025-05-12 PROCEDURE — 82570 ASSAY OF URINE CREATININE: CPT

## 2025-05-12 PROCEDURE — 3074F SYST BP LT 130 MM HG: CPT | Performed by: FAMILY MEDICINE

## 2025-05-12 PROCEDURE — 80053 COMPREHEN METABOLIC PANEL: CPT

## 2025-05-12 PROCEDURE — 3051F HG A1C>EQUAL 7.0%<8.0%: CPT | Performed by: FAMILY MEDICINE

## 2025-05-12 PROCEDURE — G0439 PPPS, SUBSEQ VISIT: HCPCS | Performed by: FAMILY MEDICINE

## 2025-05-12 PROCEDURE — 84443 ASSAY THYROID STIM HORMONE: CPT

## 2025-05-12 PROCEDURE — 80061 LIPID PANEL: CPT

## 2025-05-12 PROCEDURE — 1126F AMNT PAIN NOTED NONE PRSNT: CPT | Performed by: FAMILY MEDICINE

## 2025-05-12 RX ORDER — LANCETS 33 GAUGE
1 EACH MISCELLANEOUS DAILY
Qty: 100 EACH | Refills: 3 | Status: SHIPPED | OUTPATIENT
Start: 2025-05-12

## 2025-05-12 RX ORDER — METFORMIN HYDROCHLORIDE 500 MG/1
1000 TABLET, EXTENDED RELEASE ORAL DAILY
Qty: 180 TABLET | Refills: 3 | Status: SHIPPED | OUTPATIENT
Start: 2025-05-12

## 2025-05-12 RX ORDER — METOPROLOL SUCCINATE 100 MG/1
100 TABLET, EXTENDED RELEASE ORAL DAILY
Qty: 90 TABLET | Refills: 3 | Status: SHIPPED | OUTPATIENT
Start: 2025-05-12

## 2025-05-12 RX ORDER — ATORVASTATIN CALCIUM 20 MG/1
20 TABLET, FILM COATED ORAL DAILY
Qty: 90 TABLET | Refills: 3 | Status: SHIPPED | OUTPATIENT
Start: 2025-05-12

## 2025-05-12 RX ORDER — LISINOPRIL 20 MG/1
20 TABLET ORAL DAILY
Qty: 90 TABLET | Refills: 3 | Status: SHIPPED | OUTPATIENT
Start: 2025-05-12

## 2025-05-12 RX ORDER — BLOOD SUGAR DIAGNOSTIC
1 STRIP MISCELLANEOUS DAILY
Qty: 100 EACH | Refills: 3 | Status: SHIPPED | OUTPATIENT
Start: 2025-05-12

## 2025-05-12 NOTE — PROGRESS NOTES
The ABCs of the Annual Wellness Visit  Subsequent Medicare Wellness Visit    Subjective    Maile Wright is a 70 y.o. patient who presents for a Subsequent Medicare Wellness Visit.    The following portions of the patient's history were reviewed and updated as appropriate: allergies, current medications, past family history, past medical history, past social history, past surgical history, and problem list.    Compared to one year ago, the patient feels her physical health is worse.  She says that she is moving slower.    Compared to one year ago, the patient feels her mental health is the same.    Recent Hospitalizations:  She was not admitted to the hospital during the last year.     Current Medical Providers:  Patient Care Team:  Saleem Jimenze MD as PCP - General (Family Medicine)  Gin Burnett MD as Consulting Physician (Obstetrics and Gynecology)  Celine Saleh MD as Consulting Physician (Gynecologic Oncology)  Felix Michaels MD as Consulting Physician (Hematology and Oncology)    Outpatient Medications Prior to Visit   Medication Sig Dispense Refill    calcium carbonate-vitamin d 600-400 MG-UNIT per tablet Take 1 tablet by mouth Daily.      Cholecalciferol 50 MCG (2000 UT) capsule Take 1 capsule by mouth Daily.      glucose monitor monitoring kit Check blood sugar once daily  DX E11.9 1 each 0    Semaglutide (Rybelsus) 7 MG tablet Take 7 mg by mouth Daily. 90 tablet 3    atorvastatin (LIPITOR) 20 MG tablet Take 1 tablet by mouth Daily. 90 tablet 3    glucose blood (OneTouch Ultra) test strip 1 each by Other route Daily. Check blood sugar Dx E11.9 100 each 3    Lancets (OneTouch Delica Plus [id number]) misc 1 each by Other route Daily. DxE11.9 100 each 3    lisinopril (PRINIVIL,ZESTRIL) 20 MG tablet Take 1 tablet by mouth Daily. 90 tablet 3    metFORMIN ER (GLUCOPHAGE-XR) 500 MG 24 hr tablet Take 2 tablets by mouth Daily. 180 tablet 3    metoprolol succinate XL (TOPROL-XL) 100 MG 24 hr  "tablet Take 1 tablet by mouth Daily. 90 tablet 3     No facility-administered medications prior to visit.       No opioid medication identified on active medication list. I have reviewed chart for other potential  high risk medication/s and harmful drug interactions in the elderly.      Aspirin is not on active medication list.  Aspirin use is not indicated based on review of current medical condition/s. Risk of harm outweighs potential benefits.      Patient Active Problem List   Diagnosis    Essential hypertension    Morbid obesity    Type 2 diabetes mellitus without complication, without long-term current use of insulin    Mixed hyperlipidemia     Advance Care Planning  Advance Directive is not on file.  ACP discussion was held with the patient during this visit. Patient has an advance directive (not in EMR), copy requested.  Her , Bao, would make decisions if needed.     Objective    Vitals:    05/12/25 1334   BP: 117/72   BP Location: Left arm   Patient Position: Sitting   Pulse: 96   Temp: 98.2 °F (36.8 °C)   TempSrc: Oral   SpO2: 95%   Weight: 91.4 kg (201 lb 6.4 oz)   Height: 166.4 cm (65.51\")   PainSc: 0-No pain     Estimated body mass index is 32.99 kg/m² as calculated from the following:    Height as of this encounter: 166.4 cm (65.51\").    Weight as of this encounter: 91.4 kg (201 lb 6.4 oz).    BMI is >= 30 and <35. (Class 1 Obesity). The following options were offered after discussion;: exercise counseling/recommendations and nutrition counseling/recommendations    Does the patient have evidence of cognitive impairment? No        HEALTH RISK ASSESSMENT    Smoking Status:  Social History     Tobacco Use   Smoking Status Never   Smokeless Tobacco Never   Tobacco Comments    Never smoked     Alcohol Consumption:  Social History     Substance and Sexual Activity   Alcohol Use Not Currently     Fall Risk Screen:    STEADI Fall Risk Assessment was completed, and patient is at LOW risk for " falls.Assessment completed on:2025    Depression Screenin/12/2025     1:34 PM   PHQ-2/PHQ-9 Depression Screening   Little interest or pleasure in doing things Not at all   Feeling down, depressed, or hopeless Not at all   How difficult have these problems made it for you to do your work, take care of things at home, or get along with other people? Not difficult at all       Health Habits and Functional and Cognitive Screenin/12/2025     1:34 PM   Functional & Cognitive Status   Do you have difficulty preparing food and eating? No   Do you have difficulty bathing yourself, getting dressed or grooming yourself? No   Do you have difficulty using the toilet? No   Do you have difficulty moving around from place to place? No   Do you have trouble with steps or getting out of a bed or a chair? No   Current Diet Well Balanced Diet   Dental Exam Not up to date   Eye Exam Not up to date   Exercise (times per week) 3 times per week   Current Exercises Include Yard Work   Do you need help using the phone?  No   Are you deaf or do you have serious difficulty hearing?  No   Do you need help to go to places out of walking distance? No   Do you need help shopping? No   Do you need help preparing meals?  No   Do you need help with housework?  No   Do you need help with laundry? No   Do you need help taking your medications? No   Do you need help managing money? No   Do you ever drive or ride in a car without wearing a seat belt? No   Have you felt unusual stress, anger or loneliness in the last month? No   Who do you live with? Spouse   If you need help, do you have trouble finding someone available to you? No   Have you been bothered in the last four weeks by sexual problems? No   Do you have difficulty concentrating, remembering or making decisions? No       Age-appropriate Screening Schedule:  Refer to the list below for future screening recommendations based on patient's age, sex and/or medical conditions.  Orders for these recommended tests are listed in the plan section. The patient has been provided with a written plan.    Health Maintenance   Topic Date Due    URINE MICROALBUMIN-CREATININE RATIO (uACR)  Never done    DIABETIC EYE EXAM  01/24/2023    LIPID PANEL  04/17/2025    HEMOGLOBIN A1C  07/23/2025    COVID-19 Vaccine (1 - 2024-25 season) 10/18/2025 (Originally 9/1/2024)    TDAP/TD VACCINES (1 - Tdap) 10/18/2025 (Originally 1/28/1974)    ZOSTER VACCINE (1 of 2) 10/18/2025 (Originally 1/28/2004)    Pneumococcal Vaccine 50+ (1 of 2 - PCV) 05/12/2026 (Originally 1/28/1974)    INFLUENZA VACCINE  07/01/2025    DIABETIC FOOT EXAM  10/18/2025    ANNUAL WELLNESS VISIT  05/12/2026    MAMMOGRAM  11/04/2026    DXA SCAN  11/04/2026    COLORECTAL CANCER SCREENING  11/18/2027    HEPATITIS C SCREENING  Completed          CMS Preventative Services Quick Reference  Risk Factors Identified During Encounter  None Identified  The above risks/problems have been discussed with the patient.  Pertinent information has been shared with the patient in the After Visit Summary.  An After Visit Summary and PPPS were made available to the patient.    Follow Up:   Next Medicare Wellness visit to be scheduled in 1 year.     Additional E&M Note during same encounter follows:  Patient has multiple medical problems which are significant and separately identifiable that require additional work above and beyond the Medicare Wellness Visit.      Chief Complaint:  Diabetes, blood pressure, cholesterol    Subjective    History of Present Illness:  In addition to the Medicare wellness exam, Paris is here for follow-up on her usual care.  She has type 2 diabetes for which she currently takes metformin and Rybelsus.  She says her blood sugar typically runs in the 120s.  She does not typically have low blood sugars.     She also has hypertension and elevated cholesterol for which she remains on treatments as noted.  Her blood pressure is well controlled  "on initial check.  She does not check her blood pressure at home routinely.    Review of Systems:  Review of Systems   Constitutional:  Negative for chills and fever.   Respiratory:  Negative for cough and shortness of breath.    Cardiovascular:  Negative for chest pain and palpitations.   Gastrointestinal:  Negative for abdominal pain, nausea and vomiting.      Objective   Vital Signs:  /72 (BP Location: Left arm, Patient Position: Sitting)   Pulse 96   Temp 98.2 °F (36.8 °C) (Oral)   Ht 166.4 cm (65.51\")   Wt 91.4 kg (201 lb 6.4 oz)   SpO2 95%   BMI 32.99 kg/m²     Physical Exam  Vitals and nursing note reviewed.   Constitutional:       General: She is not in acute distress.     Appearance: She is obese. She is not ill-appearing.   HENT:      Right Ear: Tympanic membrane and ear canal normal.      Left Ear: Tympanic membrane and ear canal normal.      Ears:      Comments: Hearing is normal with forced whisper bilaterally.     Mouth/Throat:      Mouth: Mucous membranes are moist.      Comments: Pharynx appears normal  Eyes:      Extraocular Movements: Extraocular movements intact.      Pupils: Pupils are equal, round, and reactive to light.      Comments: Binocular vision is 20/20 with correction.   Neck:      Thyroid: No thyromegaly.   Cardiovascular:      Rate and Rhythm: Normal rate and regular rhythm.      Heart sounds: No murmur heard.  Pulmonary:      Effort: Pulmonary effort is normal.      Breath sounds: Normal breath sounds.   Abdominal:      General: There is no distension.      Palpations: Abdomen is soft. There is no mass.      Tenderness: There is no abdominal tenderness.   Musculoskeletal:      Cervical back: Normal range of motion.      Right lower leg: No edema.      Left lower leg: No edema.   Skin:     Findings: No lesion or rash.   Neurological:      General: No focal deficit present.      Mental Status: She is oriented to person, place, and time.      Cranial Nerves: No cranial nerve " deficit.      Motor: No weakness.      Coordination: Coordination normal.      Gait: Gait normal.   Psychiatric:         Mood and Affect: Mood normal.       The following data was reviewed by Saleem Jimenez MD on 05/12/2025.  Lab Results   Component Value Date    WBC 5.42 04/17/2024    HGB 12.3 04/17/2024    HCT 39.2 04/17/2024    MCV 86.7 04/17/2024     04/17/2024     Lab Results   Component Value Date    GLUCOSE 82 10/18/2024    BUN 13 10/18/2024    CREATININE 0.77 10/18/2024     10/18/2024    K 4.5 10/18/2024     10/18/2024    CALCIUM 10.5 10/18/2024    PROTEINTOT 7.6 10/18/2024    ALBUMIN 4.3 10/18/2024    ALT 19 10/18/2024    AST 21 10/18/2024    ALKPHOS 114 10/18/2024    BILITOT 0.3 10/18/2024    GLOB 3.3 10/18/2024    AGRATIO 1.3 10/18/2024    BCR 16.9 10/18/2024    ANIONGAP 9.5 10/18/2024    EGFR 83.1 10/18/2024     Lab Results   Component Value Date    CHOL 144 04/17/2024    TRIG 103 04/17/2024    HDL 56 04/17/2024    LDL 69 04/17/2024     Lab Results   Component Value Date    TSH 2.440 04/17/2024     Lab Results   Component Value Date    HGBA1C 7.5 (A) 01/23/2025             Assessment and Plan:   Today, we have reviewed Paris's care.  Overall, she seems well.  Regarding the Medicare wellness exam, she is currently up-to-date on recommended cancer screenings.  In addition, she is following up with oncology intermittently related to her previous diagnoses.  There are no specific vaccines that would be recommended today other than Prevnar 20.  She declines that though.    Regarding her usual care.  We will refill her medications and update labs as noted.  Tentative follow-up with me will be again in about 6 months, sooner if needed.    Diagnoses and all orders for this visit:    1. Physical exam (Primary)    2. Type 2 diabetes mellitus without complication, without long-term current use of insulin  -     glucose blood (OneTouch Ultra) test strip; 1 each by Other route Daily. Check  blood sugar Dx E11.9  Dispense: 100 each; Refill: 3  -     Lancets (OneTouch Delica Plus [id number]) misc; 1 each by Other route Daily. DxE11.9  Dispense: 100 each; Refill: 3  -     metFORMIN ER (GLUCOPHAGE-XR) 500 MG 24 hr tablet; Take 2 tablets by mouth Daily.  Dispense: 180 tablet; Refill: 3  -     Hemoglobin A1c; Future  -     Comprehensive Metabolic Panel; Future  -     Microalbumin / Creatinine Urine Ratio - Urine, Clean Catch; Future    3. Essential hypertension  -     atorvastatin (LIPITOR) 20 MG tablet; Take 1 tablet by mouth Daily.  Dispense: 90 tablet; Refill: 3  -     lisinopril (PRINIVIL,ZESTRIL) 20 MG tablet; Take 1 tablet by mouth Daily.  Dispense: 90 tablet; Refill: 3  -     metoprolol succinate XL (TOPROL-XL) 100 MG 24 hr tablet; Take 1 tablet by mouth Daily.  Dispense: 90 tablet; Refill: 3  -     Comprehensive Metabolic Panel; Future  -     Urinalysis With Microscopic - Urine, Clean Catch; Future    4. Mixed hyperlipidemia  -     Lipid Panel; Future  -     Comprehensive Metabolic Panel; Future  -     TSH; Future    5. Morbid obesity  -     Lipid Panel; Future  -     Hemoglobin A1c; Future    6. Other long term (current) drug therapy  -     CBC (No Diff); Future  -     Urinalysis With Microscopic - Urine, Clean Catch; Future       Follow Up  Return in about 6 months (around 11/12/2025) for Recheck, Next scheduled follow up.  Patient was given instructions and counseling regarding her condition or for health maintenance advice. Please see specific information pulled into the AVS if appropriate.

## 2025-05-13 LAB
ALBUMIN UR-MCNC: 2.6 MG/DL
CREAT UR-MCNC: 250.9 MG/DL
MICROALBUMIN/CREAT UR: 10.4 MG/G (ref 0–29)